# Patient Record
Sex: FEMALE | Race: WHITE | Employment: UNEMPLOYED | URBAN - METROPOLITAN AREA
[De-identification: names, ages, dates, MRNs, and addresses within clinical notes are randomized per-mention and may not be internally consistent; named-entity substitution may affect disease eponyms.]

---

## 2015-05-13 LAB
HPV ABSTRACT: NORMAL
PAP-ABSTRACT: NORMAL

## 2018-06-11 ENCOUNTER — TRANSFERRED RECORDS (OUTPATIENT)
Dept: HEALTH INFORMATION MANAGEMENT | Facility: CLINIC | Age: 53
End: 2018-06-11

## 2018-06-11 LAB — COLOGUARD-ABSTRACT: NEGATIVE

## 2019-06-20 PROBLEM — E03.8 SUBCLINICAL HYPOTHYROIDISM: Status: ACTIVE | Noted: 2019-06-20

## 2019-06-20 NOTE — PROGRESS NOTES
Malignant Neoplasm Of Breast Female Left (HCC)   5/5/2014 Initial Diagnosis   Malignant Neoplasm Of Breast Female Left (HCC)    April 28, 2014, patient underwent screening mammogram, which showed a suspicious lesion. Ultrasound confirmed a suspicious lesion to be 1.7 cm.     May 1, 2014, patient underwent axillary ultrasound, which showed suspicious lymph nodes.       5/5/2014 Biopsy/Pathology   Patient underwent biopsy of the left axillary lymph node and the left breast lesion. The biopsy was consistent with ductal carcinoma in both. The lesion was grade III (of III), ER/ND negative, HER-2 positive with a score of 3+ on immunohistochemistry. Ki-67 was 58.8.       5/8/2014 Other   PET scan showed FDG-avid activity in the left breast and left axilla. There were no other suspicious lesions.        5/30/2014 - Biological/Targeted/Hormone Therapy   Patient initiated her neoadjuvant therapy on the randomized arm with pertuzumab plus T-DM1.       8/29/2014 Biopsy/Pathology   Patient's right-sided lymph node biopsy was negative for any malignancy. This was performed after MRI of the breast showed suspicious lymph nodes in the right axilla. The same MRI showed excellent response of her primary tumor and left-sided adenopathy.        9/3/2014 - 10/15/2014 Chemotherapy   Patient started with cycle number one of AC.    October 15, 2014, patient finished neoadjuvant therapy.        11/12/2014 Surgery and Procedures   Patient underwent a skin-sparing double mastectomy and sentinel lymph node biopsy. She had a complete response on pathological specimen. No residual tumor was found.        - 1/27/2015 Radiation Therapy   Patient finished XRT, 50 Gy.      - 11/17/2015 Biological/Targeted/Hormone Therapy   Patient finished one year of adjuvant trastuzumab.       AXILLARY SENTINEL LYMPH NODE BIOPSY, PREOPERATIVE LYMPHOSCINTIGRAPHY Bilateral 11/12/2014   Axillary sentinel lymph node biopsy, preoperative lymphoscintigraphy     BREAST  RECONSTRUCTION-TISSUE EXPANDER Bilateral 11/12/2014   Breast Reconstruction-tissue expander     BREAST-IMPLANT EXCHANGE-SILICONE Bilateral 08/13/2015   Breast-implant exchange-silicone     BREAST-REVISION Bilateral 07/28/2016   Breast-revision     BREAST-REVISION Bilateral 11/30/2015   Breast-revision     HEMILAMINECTOMY N/A 09/26/2001   Hemilaminectomy     HYSTEROSCOPY N/A 05/27/2014   >Office hysteroscopy without endometrial biopsy.     HYSTEROSCOPY W/ POLYPECTOMY N/A 08/12/2014   >Operative hysteroscopy with polypectomy.     INJECTION-FAT Bilateral 07/28/2016   Injection-fatNotes: to breasts; donor site abdomen     INJECTION-FAT Bilateral 08/13/2015   Injection-fatNotes: to breasts     INJECTION-FAT Left 11/30/2015   Injection-fatNotes: to breast, Donor site Abdomen     LIGATION OF FALLOPIAN TUBE N/A 08/11/1987   Tubal ligation     LUMBAR DISCECTOMY N/A 09/26/2001   >Left fifth lumbar total hemilaminectomy, diskectomy.     MASTECTOMY - SKIN SPARING Bilateral 11/12/2014   Mastectomy - skin sparing     TONSILLECTOMY N/A

## 2019-06-21 ENCOUNTER — OFFICE VISIT (OUTPATIENT)
Dept: FAMILY MEDICINE | Facility: CLINIC | Age: 54
End: 2019-06-21
Payer: COMMERCIAL

## 2019-06-21 VITALS
TEMPERATURE: 97.6 F | SYSTOLIC BLOOD PRESSURE: 134 MMHG | OXYGEN SATURATION: 98 % | BODY MASS INDEX: 33.99 KG/M2 | HEART RATE: 98 BPM | WEIGHT: 204 LBS | DIASTOLIC BLOOD PRESSURE: 86 MMHG | HEIGHT: 65 IN

## 2019-06-21 DIAGNOSIS — R73.03 PREDIABETES: ICD-10-CM

## 2019-06-21 DIAGNOSIS — Z00.00 ROUTINE GENERAL MEDICAL EXAMINATION AT A HEALTH CARE FACILITY: Primary | ICD-10-CM

## 2019-06-21 DIAGNOSIS — I10 ESSENTIAL HYPERTENSION: ICD-10-CM

## 2019-06-21 DIAGNOSIS — G62.9 NEUROPATHY: ICD-10-CM

## 2019-06-21 DIAGNOSIS — Z92.21 S/P CHEMOTHERAPY, TIME SINCE GREATER THAN 12 WEEKS: ICD-10-CM

## 2019-06-21 DIAGNOSIS — E78.5 DYSLIPIDEMIA WITH ELEVATED LOW DENSITY LIPOPROTEIN (LDL) CHOLESTEROL AND ABNORMALLY LOW HIGH DENSITY LIPOPROTEIN CHOLESTEROL: ICD-10-CM

## 2019-06-21 DIAGNOSIS — C77.3 BREAST CANCER METASTASIZED TO AXILLARY LYMPH NODE, LEFT (H): ICD-10-CM

## 2019-06-21 DIAGNOSIS — Z80.41 FAMILY HISTORY OF OVARIAN CANCER: ICD-10-CM

## 2019-06-21 DIAGNOSIS — Z90.13 S/P MASTECTOMY, BILATERAL: ICD-10-CM

## 2019-06-21 DIAGNOSIS — Z98.890 S/P DISCECTOMY: ICD-10-CM

## 2019-06-21 DIAGNOSIS — R10.13 DYSPEPSIA: ICD-10-CM

## 2019-06-21 DIAGNOSIS — C50.912 BREAST CANCER METASTASIZED TO AXILLARY LYMPH NODE, LEFT (H): ICD-10-CM

## 2019-06-21 DIAGNOSIS — E03.8 SUBCLINICAL HYPOTHYROIDISM: ICD-10-CM

## 2019-06-21 DIAGNOSIS — E55.9 VITAMIN D DEFICIENCY: ICD-10-CM

## 2019-06-21 DIAGNOSIS — K21.00 GASTROESOPHAGEAL REFLUX DISEASE WITH ESOPHAGITIS: ICD-10-CM

## 2019-06-21 DIAGNOSIS — Z98.82 S/P SILICONE BREAST IMPLANT: ICD-10-CM

## 2019-06-21 LAB
ALBUMIN SERPL-MCNC: 3.8 G/DL (ref 3.4–5)
ALP SERPL-CCNC: 88 U/L (ref 40–150)
ALT SERPL W P-5'-P-CCNC: 58 U/L (ref 0–50)
ANION GAP SERPL CALCULATED.3IONS-SCNC: 11 MMOL/L (ref 3–14)
AST SERPL W P-5'-P-CCNC: 48 U/L (ref 0–45)
BILIRUB SERPL-MCNC: 0.5 MG/DL (ref 0.2–1.3)
BUN SERPL-MCNC: 18 MG/DL (ref 7–30)
CALCIUM SERPL-MCNC: 9.2 MG/DL (ref 8.5–10.1)
CANCER AG27-29 SERPL-ACNC: 14 U/ML (ref 0–39)
CHLORIDE SERPL-SCNC: 104 MMOL/L (ref 94–109)
CHOLEST SERPL-MCNC: 192 MG/DL
CO2 SERPL-SCNC: 28 MMOL/L (ref 20–32)
CREAT SERPL-MCNC: 0.76 MG/DL (ref 0.52–1.04)
ERYTHROCYTE [DISTWIDTH] IN BLOOD BY AUTOMATED COUNT: 15.3 % (ref 10–15)
FERRITIN SERPL-MCNC: 50 NG/ML (ref 8–252)
GFR SERPL CREATININE-BSD FRML MDRD: 90 ML/MIN/{1.73_M2}
GLUCOSE SERPL-MCNC: 102 MG/DL (ref 70–99)
HBA1C MFR BLD: 5.7 % (ref 0–5.6)
HCT VFR BLD AUTO: 45.4 % (ref 35–47)
HDLC SERPL-MCNC: 51 MG/DL
HGB BLD-MCNC: 15.2 G/DL (ref 11.7–15.7)
LDLC SERPL CALC-MCNC: 120 MG/DL
MAGNESIUM SERPL-MCNC: 2.3 MG/DL (ref 1.6–2.3)
MCH RBC QN AUTO: 27.8 PG (ref 26.5–33)
MCHC RBC AUTO-ENTMCNC: 33.5 G/DL (ref 31.5–36.5)
MCV RBC AUTO: 83 FL (ref 78–100)
NONHDLC SERPL-MCNC: 141 MG/DL
PLATELET # BLD AUTO: 300 10E9/L (ref 150–450)
POTASSIUM SERPL-SCNC: 3 MMOL/L (ref 3.4–5.3)
PROT SERPL-MCNC: 7.6 G/DL (ref 6.8–8.8)
RBC # BLD AUTO: 5.47 10E12/L (ref 3.8–5.2)
SODIUM SERPL-SCNC: 143 MMOL/L (ref 133–144)
TRIGL SERPL-MCNC: 107 MG/DL
TSH SERPL DL<=0.005 MIU/L-ACNC: 2.95 MU/L (ref 0.4–4)
VIT B12 SERPL-MCNC: 589 PG/ML (ref 193–986)
WBC # BLD AUTO: 6.7 10E9/L (ref 4–11)

## 2019-06-21 PROCEDURE — 85027 COMPLETE CBC AUTOMATED: CPT | Performed by: INTERNAL MEDICINE

## 2019-06-21 PROCEDURE — 86300 IMMUNOASSAY TUMOR CA 15-3: CPT | Performed by: INTERNAL MEDICINE

## 2019-06-21 PROCEDURE — 83036 HEMOGLOBIN GLYCOSYLATED A1C: CPT | Performed by: INTERNAL MEDICINE

## 2019-06-21 PROCEDURE — 83735 ASSAY OF MAGNESIUM: CPT | Performed by: INTERNAL MEDICINE

## 2019-06-21 PROCEDURE — 80061 LIPID PANEL: CPT | Performed by: INTERNAL MEDICINE

## 2019-06-21 PROCEDURE — 99213 OFFICE O/P EST LOW 20 MIN: CPT | Mod: 25 | Performed by: INTERNAL MEDICINE

## 2019-06-21 PROCEDURE — 82607 VITAMIN B-12: CPT | Performed by: INTERNAL MEDICINE

## 2019-06-21 PROCEDURE — 80053 COMPREHEN METABOLIC PANEL: CPT | Performed by: INTERNAL MEDICINE

## 2019-06-21 PROCEDURE — 99386 PREV VISIT NEW AGE 40-64: CPT | Performed by: INTERNAL MEDICINE

## 2019-06-21 PROCEDURE — 86141 C-REACTIVE PROTEIN HS: CPT | Performed by: INTERNAL MEDICINE

## 2019-06-21 PROCEDURE — 84443 ASSAY THYROID STIM HORMONE: CPT | Performed by: INTERNAL MEDICINE

## 2019-06-21 PROCEDURE — 36415 COLL VENOUS BLD VENIPUNCTURE: CPT | Performed by: INTERNAL MEDICINE

## 2019-06-21 PROCEDURE — 82306 VITAMIN D 25 HYDROXY: CPT | Performed by: INTERNAL MEDICINE

## 2019-06-21 PROCEDURE — 82728 ASSAY OF FERRITIN: CPT | Performed by: INTERNAL MEDICINE

## 2019-06-21 RX ORDER — HYDROCHLOROTHIAZIDE 25 MG/1
25 TABLET ORAL DAILY
COMMUNITY
Start: 2019-01-03 | End: 2019-06-21

## 2019-06-21 RX ORDER — CYCLOBENZAPRINE HCL 10 MG
10 TABLET ORAL
COMMUNITY
Start: 2019-04-30 | End: 2019-07-03

## 2019-06-21 RX ORDER — PREGABALIN 50 MG/1
50 CAPSULE ORAL 2 TIMES DAILY
Qty: 180 CAPSULE | Refills: 3 | Status: SHIPPED | OUTPATIENT
Start: 2019-06-21 | End: 2019-11-04

## 2019-06-21 RX ORDER — PREGABALIN 50 MG/1
50 CAPSULE ORAL 2 TIMES DAILY
Qty: 180 CAPSULE | Refills: 3 | Status: SHIPPED | OUTPATIENT
Start: 2019-06-21 | End: 2019-06-21

## 2019-06-21 RX ORDER — TRIAMCINOLONE ACETONIDE 5 MG/G
1 CREAM TOPICAL PRN
COMMUNITY
Start: 2014-05-09 | End: 2019-07-03

## 2019-06-21 RX ORDER — LORATADINE 10 MG/1
1 TABLET ORAL DAILY
COMMUNITY
Start: 2012-12-11

## 2019-06-21 RX ORDER — CELECOXIB 200 MG/1
200 CAPSULE ORAL DAILY
Qty: 90 CAPSULE | Refills: 3 | Status: SHIPPED | OUTPATIENT
Start: 2019-06-21 | End: 2019-11-04

## 2019-06-21 RX ORDER — AMOXICILLIN 500 MG/1
4 CAPSULE ORAL
COMMUNITY
Start: 2018-06-29 | End: 2020-10-19

## 2019-06-21 RX ORDER — TRIAMTERENE/HYDROCHLOROTHIAZID 37.5-25 MG
1 TABLET ORAL DAILY
Qty: 90 TABLET | Refills: 3 | Status: SHIPPED | OUTPATIENT
Start: 2019-06-21 | End: 2020-06-02

## 2019-06-21 RX ORDER — IBUPROFEN 600 MG/1
1 TABLET, FILM COATED ORAL DAILY PRN
COMMUNITY
Start: 2016-06-15 | End: 2019-06-21

## 2019-06-21 ASSESSMENT — MIFFLIN-ST. JEOR: SCORE: 1531.22

## 2019-06-21 NOTE — PATIENT INSTRUCTIONS
The shingrix is our newer vaccine and is recommended to healthy adults over 55. It's administered in 2 separate doses. You can just walk into the pharmacy and get it when convenient for you. However, it's on a national shortage currently so we recommend calling the pharmacy a head of time to verify they have it in stock ([facility] and many other pharmacies carry this). Generally speaking the pharmacy is covered better by insurance than the clinic.  Please check with your insurance to make sure what is covered/what your copay would be.     Here is a link from the CDC on Shingles Vaccines, where they provide information on this new Shingrix vaccine.   https://www.cdc.gov/shingles/vaccination.html

## 2019-06-21 NOTE — PROGRESS NOTES
SUBJECTIVE:   CC: Bertha Hollingswroth is an 53 year old woman who presents for preventive health visit.   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 Ultrasound confirmed a suspicious lesion to be 1.7 cm.     May 1, 2014, patient underwent axillary ultrasound, which showed suspicious lymph nodes.       5/5/2014 Biopsy/Pathology   Patient underwent biopsy of the left axillary lymph node and the left breast lesion. The biopsy was consistent with ductal carcinoma in both. The lesion was grade III (of III), ER/GA negative, HER-2 positive with a score of 3+ on immunohistochemistry. Ki-67 was 58.8.       5/8/2014 Other   PET scan showed FDG-avid activity in the left breast and left axilla. There were no other suspicious lesions.        5/30/2014 - Biological/Targeted/Hormone Therapy   Patient initiated her neoadjuvant therapy on the randomized arm with pertuzumab plus T-DM1.       8/29/2014 Biopsy/Pathology   Patient's right-sided lymph node biopsy was negative for any malignancy. This was performed after MRI of the breast showed suspicious lymph nodes in the right axilla. The same MRI showed excellent response of her primary tumor and left-sided adenopathy.        9/3/2014 - 10/15/2014 Chemotherapy   Patient started with cycle number one of AC.    October 15, 2014, patient finished neoadjuvant therapy.        11/12/2014 Surgery and Procedures   Patient underwent a skin-sparing double mastectomy and sentinel lymph  node biopsy. She had a complete response on pathological specimen. No residual tumor was found.        - 1/27/2015 Radiation Therapy   Patient finished XRT, 50 Gy.      - 11/17/2015 Biological/Targeted/Hormone Therapy   Patient finished one year of adjuvant trastuzumab.       AXILLARY SENTINEL LYMPH NODE BIOPSY, PREOPERATIVE LYMPHOSCINTIGRAPHY Bilateral 11/12/2014   Axillary sentinel lymph node biopsy, preoperative lymphoscintigraphy     BREAST RECONSTRUCTION-TISSUE EXPANDER Bilateral 11/12/2014   Breast Reconstruction-tissue expander     BREAST-IMPLANT EXCHANGE-SILICONE Bilateral 08/13/2015   Breast-implant exchange-silicone     BREAST-REVISION Bilateral 07/28/2016   Breast-revision     BREAST-REVISION Bilateral 11/30/2015   Breast-revision     HEMILAMINECTOMY N/A 09/26/2001   Hemilaminectomy     HYSTEROSCOPY N/A 05/27/2014   >Office hysteroscopy without endometrial biopsy.     HYSTEROSCOPY W/ POLYPECTOMY N/A 08/12/2014   >Operative hysteroscopy with polypectomy.     INJECTION-FAT Bilateral 07/28/2016   Injection-fatNotes: to breasts; donor site abdomen     INJECTION-FAT Bilateral 08/13/2015   Injection-fatNotes: to breasts     INJECTION-FAT Left 11/30/2015   Injection-fatNotes: to breast, Donor site Abdomen     LIGATION OF FALLOPIAN TUBE N/A 08/11/1987   Tubal ligation     LUMBAR DISCECTOMY N/A 09/26/2001   >Left fifth lumbar total hemilaminectomy, diskectomy.     MASTECTOMY - SKIN SPARING Bilateral 11/12/2014   Mastectomy - skin sparing     TONSILLECTOMY N/A         I have also gathered family history  She has continuous back pain despite back surgery and multiple epidural injections and is on ibuprofen  She has bloating of the stomach  She has not done a colon screen  She has left-sided foot drop  Healthy Habits:     Getting at least 3 servings of Calcium per day:  NO (not every day )    Bi-annual eye exam:  Yes    Dental care twice a year:  Yes    Sleep apnea or symptoms of sleep apnea:  None    Diet:   Regular (no restrictions)    Frequency of exercise:  2-3 days/week    Duration of exercise:  15-30 minutes    Taking medications regularly:  Yes    Barriers to taking medications:  None    Medication side effects:  None    PHQ-2 Total Score: 0    Additional concerns today:  No              Today's PHQ-2 Score:   PHQ-2 ( 1999 Pfizer) 6/21/2019   Q1: Little interest or pleasure in doing things 0   Q2: Feeling down, depressed or hopeless 0   PHQ-2 Score 0       Abuse: Current or Past(Physical, Sexual or Emotional)- No  Do you feel safe in your environment? Yes    Social History     Tobacco Use     Smoking status: Never Smoker     Smokeless tobacco: Never Used   Substance Use Topics     Alcohol use: Yes     Comment: rare maybe 1-2 drinks a YEAR          No flowsheet data found.    Reviewed orders with patient.  Reviewed health maintenance and updated orders accordingly - Yes  BP Readings from Last 3 Encounters:   06/21/19 134/86    Wt Readings from Last 3 Encounters:   06/21/19 92.5 kg (204 lb)                  Patient Active Problem List   Diagnosis     Breast cancer metastasized to axillary lymph node, left (H)     S/P chemotherapy, time since greater than 12 weeks     S/P hemilaminotomy     S/P silicone breast implant     S/P mastectomy, bilateral     Family history of ovarian cancer     Dyslipidemia with elevated low density lipoprotein (LDL) cholesterol and abnormally low high density lipoprotein cholesterol     Subclinical hypothyroidism     Past Surgical History:   Procedure Laterality Date     AS LIGATE FALLOPIAN TUBE       C ANTERIOR LUMBAR DISCECTOMY      2001     HC REVISE BREAST RECONSTRUCTION      2015.2016     HYSTEROSCOPY      2014     TONSILLECTOMY         Social History     Tobacco Use     Smoking status: Never Smoker     Smokeless tobacco: Never Used   Substance Use Topics     Alcohol use: Yes     Comment: rare maybe 1-2 drinks a YEAR      Family History   Problem Relation Age of Onset     Ovarian  "Cancer Mother      Bipolar Disorder Sister      Diabetic Kidney Disease Maternal Grandmother          Current Outpatient Medications   Medication Sig Dispense Refill     amoxicillin (AMOXIL) 500 MG capsule Take 4 capsules by mouth Before dental procedures       celecoxib (CELEBREX) 200 MG capsule Take 1 capsule (200 mg) by mouth daily 90 capsule 3     cyclobenzaprine (FLEXERIL) 10 MG tablet Take 10 mg by mouth nightly as needed       loratadine (CLARITIN) 10 MG tablet Take 1 tablet by mouth daily       omeprazole (PRILOSEC) 20 MG DR capsule Take 20 mg by mouth daily       pregabalin (LYRICA) 50 MG capsule Take 1 capsule (50 mg) by mouth 2 times daily 180 capsule 3     triamcinolone (ARISTOCORT HP) 0.5 % external cream Apply 1 Application topically as needed       triamterene-HCTZ (MAXZIDE-25) 37.5-25 MG tablet Take 1 tablet by mouth daily 90 tablet 3     ranitidine (ZANTAC) 150 MG capsule Take 150 mg by mouth daily as needed             Pertinent mammograms are reviewed under the imaging tab.  History of abnormal Pap smear: NO - age 30-65 PAP every 5 years with negative HPV co-testing recommended     Reviewed and updated as needed this visit by clinical staff  Tobacco  Allergies  Meds  Problems  Fam Hx  Soc Hx        Reviewed and updated as needed this visit by Provider  Tobacco  Meds  Problems  Fam Hx            Review of Systems   ROS: 10 point ROS neg other than the symptoms noted above in the HPI.       OBJECTIVE:   /86   Pulse 98   Temp 97.6  F (36.4  C) (Tympanic)   Ht 1.651 m (5' 5\")   Wt 92.5 kg (204 lb)   SpO2 98%   BMI 33.95 kg/m    Physical Exam  GENERAL: healthy, alert and no distress  EYES: Eyes grossly normal to inspection, PERRL and conjunctivae and sclerae normal  HENT: ear canals and TM's normal, nose and mouth without ulcers or lesions  NECK: no adenopathy, no asymmetry, masses, or scars and thyroid normal to palpation  RESP: lungs clear to auscultation - no rales, rhonchi or " wheezes  BREAST: Bilateral chest wall and surgical scar normal implants are noticed without masses, tenderness or nipple discharge and no palpable axillary masses or adenopathy  Radiation scar is noticed on both sides with implants  CV: regular rate and rhythm, normal S1 S2, no S3 or S4, no murmur, click or rub, no peripheral edema and peripheral pulses strong  ABDOMEN: soft, nontender, no hepatosplenomegaly, no masses and bowel sounds normal  MS: no gross musculoskeletal defects noted, no edema  SKIN: no suspicious lesions or rashes multiple lentigines  NEURO: Normal strength and tone, mentation intact and speech normal  Old left-sided foot drop  PSYCH: mentation appears normal, affect normal/bright        ASSESSMENT/PLAN:   Bertha was seen today for physical.    Diagnoses and all orders for this visit:    Routine general medical examination at a health care facility  She will take the new shingles vaccine and lose weight  Breast cancer metastasized to axillary lymph node, left (H)  -     Vitamin D Deficiency  -     Ca27.29  breast tumor marker    S/P chemotherapy, time since greater than 12 weeks  -     Comprehensive metabolic panel  -     celecoxib (CELEBREX) 200 MG capsule; Take 1 capsule (200 mg) by mouth daily  -     H Pylori antigen, stool; Future    S/P discectomy  We will try Lyrica  We discussed medical marijuana  Orders:  -     Discontinue: pregabalin (LYRICA) 50 MG capsule; Take 1 capsule (50 mg) by mouth 2 times daily  -     [facility] PT, HAND, AND CHIROPRACTIC REFERRAL; Future  -     pregabalin (LYRICA) 50 MG capsule; Take 1 capsule (50 mg) by mouth 2 times daily    S/P silicone breast implant    S/P mastectomy, bilateral    Family history of ovarian cancer    Dyslipidemia with elevated low density lipoprotein (LDL) cholesterol and abnormally low high density lipoprotein cholesterol  -     Comprehensive metabolic panel  -     Lipid panel reflex to direct LDL Fasting  -     CRP cardiac risk  -      "GASTROENTEROLOGY ADULT REF PROCEDURE ONLY [facility] Mendez [phone]; Dr. KACIE Espinoza    Subclinical hypothyroidism  We will check a TSH  Gastroesophageal reflux disease with esophagitis  -     CBC with platelets  -     Magnesium  -     Ferritin    Essential hypertension  -     triamterene-HCTZ (MAXZIDE-25) 37.5-25 MG tablet; Take 1 tablet by mouth daily  We will stop hydrochlorothiazide neuropathy  -     Vitamin B12  -     Hemoglobin A1c  -     TSH with free T4 reflex  -     H Pylori antigen, stool; Future    Prediabetes  -     Hemoglobin A1c    Dyspepsia    We will need endoscopy and colonoscopy    COUNSELING:  This is a very complex  Patient needs to lose 10 to 15 pounds of weight before next visit  We need upper and lower colonoscopy  We need to work-up neuropathy  I discussed with breast reconstructive surgery  We might need to start metformin  We need to increase blood pressure medications  We need to check for genetic counseling    Estimated body mass index is 33.95 kg/m  as calculated from the following:    Height as of this encounter: 1.651 m (5' 5\").    Weight as of this encounter: 92.5 kg (204 lb).    Weight management plan: Discussed healthy diet and exercise guidelines     reports that she has never smoked. She has never used smokeless tobacco.      Counseling Resources:  ATP IV Guidelines  Pooled Cohorts Equation Calculator  Breast Cancer Risk Calculator  FRAX Risk Assessment  ICSI Preventive Guidelines  Dietary Guidelines for Americans, 2010  USDA's MyPlate  ASA Prophylaxis  Lung CA Screening    Zaira Schultz MD  [facility]  "

## 2019-06-24 LAB — CRP SERPL HS-MCNC: 4.2 MG/L

## 2019-06-25 LAB — DEPRECATED CALCIDIOL+CALCIFEROL SERPL-MC: 9 UG/L (ref 20–75)

## 2019-06-26 ENCOUNTER — HOSPITAL ENCOUNTER (OUTPATIENT)
Facility: CLINIC | Age: 54
End: 2019-06-26
Attending: SPECIALIST | Admitting: SPECIALIST
Payer: COMMERCIAL

## 2019-07-03 ENCOUNTER — MYC MEDICAL ADVICE (OUTPATIENT)
Dept: FAMILY MEDICINE | Facility: CLINIC | Age: 54
End: 2019-07-03

## 2019-07-03 DIAGNOSIS — R21 RASH: ICD-10-CM

## 2019-07-03 DIAGNOSIS — Z98.890 S/P DISCECTOMY: Primary | ICD-10-CM

## 2019-07-03 RX ORDER — CYCLOBENZAPRINE HCL 10 MG
10 TABLET ORAL
Qty: 30 TABLET | Refills: 1 | Status: SHIPPED | OUTPATIENT
Start: 2019-07-03 | End: 2019-09-10

## 2019-07-03 RX ORDER — TRIAMCINOLONE ACETONIDE 5 MG/G
CREAM TOPICAL PRN
Qty: 30 G | Refills: 1 | Status: SHIPPED | OUTPATIENT
Start: 2019-07-03 | End: 2020-10-01

## 2019-07-03 NOTE — TELEPHONE ENCOUNTER
Last Written Prescription Date:  N/A MEDS HISTORICAL    Flexeril 10mg historical on med list - 10mg at HS PRN, not previously sent through Epic     Triamcinolone 0.5% cream - historical as apply 1 application topical as needed    Last office visit: 6/21/2019 with prescribing provider:  Dr. Schultz      Routing refill request to provider for review/approval because:  Drugs not active on patient's medication list    Abimbola RICHEY RN

## 2019-07-22 ENCOUNTER — HOSPITAL ENCOUNTER (OUTPATIENT)
Facility: CLINIC | Age: 54
End: 2019-07-22
Attending: SPECIALIST | Admitting: SPECIALIST
Payer: COMMERCIAL

## 2019-09-10 ENCOUNTER — MYC REFILL (OUTPATIENT)
Dept: FAMILY MEDICINE | Facility: CLINIC | Age: 54
End: 2019-09-10

## 2019-09-10 DIAGNOSIS — Z98.890 S/P DISCECTOMY: ICD-10-CM

## 2019-09-10 DIAGNOSIS — E55.9 VITAMIN D DEFICIENCY: ICD-10-CM

## 2019-09-11 RX ORDER — CYCLOBENZAPRINE HCL 10 MG
10 TABLET ORAL
Start: 2019-09-11

## 2019-09-11 RX ORDER — CYCLOBENZAPRINE HCL 10 MG
10 TABLET ORAL
Qty: 30 TABLET | Refills: 1 | Status: SHIPPED | OUTPATIENT
Start: 2019-09-11 | End: 2019-11-04

## 2019-09-11 NOTE — TELEPHONE ENCOUNTER
"Routing refill request to provider for review/approval because:  Drug not on the [facility] refill protocol   Shamika CONDE RN    Last Written Prescription Date:    Last Fill Quantity: ,  # refills:    Last office visit: 6/21/2019 with prescribing provider:     Future Office Visit:   Next 5 appointments (look out 90 days)    Nov 04, 2019  1:30 PM CST  [facility] [address] [id number] [phone]         Requested Prescriptions   Pending Prescriptions Disp Refills     vitamin D3 (CHOLECALCIFEROL) 08706 units capsule 12 capsule 0     Sig: Take 1 capsule (50,000 Units) by mouth once a week       Vitamin Supplements (Adult) Protocol Failed - 9/10/2019  4:55 PM        Failed - High dose Vitamin D not ordered        Passed - Recent (12 mo) or future (30 days) visit within the authorizing provider's specialty     Patient had office visit in the last 12 months or has a visit in the next 30 days with authorizing provider or within the authorizing provider's specialty.  See \"Patient Info\" tab in inbasket, or \"Choose Columns\" in Meds & Orders section of the refill encounter.              Passed - Medication is active on med list        cyclobenzaprine (FLEXERIL) 10 MG tablet 30 tablet 1     Sig: Take 1 tablet (10 mg) by mouth nightly as needed       There is no refill protocol information for this order        "

## 2019-09-11 NOTE — TELEPHONE ENCOUNTER
Routing refill request to provider for review/approval because:  Vitamin D3- Pt is overdue for Vitamin D recheck-  requests to remain on Vitamin D3 until her visit with PCP on 11/4    Cyclobenzaprine- Drug not on the [facility] refill protocol     cyclobenzaprine (FLEXERIL) 10 MG tablet  Last Written Prescription Date: 7/3/19  Last Fill Quantity: 30,  # refills: 1   Last office visit: 6/21/2019 with prescribing provider:  Marion Traylor Office Visit:   Next 5 appointments (look out 90 days)    Nov 04, 2019  1:30 PM CST  [facility] [address] [id number] [phone]            vitamin D3 (CHOLECALCIFEROL) 53866 units capsule  Last Written Prescription Date:  6/25/19  Last Fill Quantity: 12,  # refills: 0   Last office visit: 6/21/2019 with prescribing provider:  Marion Traylor Office Visit:

## 2019-11-04 ENCOUNTER — HOSPITAL ENCOUNTER (OUTPATIENT)
Dept: CT IMAGING | Facility: CLINIC | Age: 54
Discharge: HOME | End: 2019-11-04
Attending: INTERNAL MEDICINE | Admitting: INTERNAL MEDICINE
Payer: COMMERCIAL

## 2019-11-04 ENCOUNTER — OFFICE VISIT (OUTPATIENT)
Dept: FAMILY MEDICINE | Facility: CLINIC | Age: 54
End: 2019-11-04
Payer: COMMERCIAL

## 2019-11-04 VITALS
SYSTOLIC BLOOD PRESSURE: 139 MMHG | BODY MASS INDEX: 30.82 KG/M2 | TEMPERATURE: 98.1 F | WEIGHT: 185 LBS | OXYGEN SATURATION: 94 % | HEIGHT: 65 IN | DIASTOLIC BLOOD PRESSURE: 88 MMHG | HEART RATE: 92 BPM

## 2019-11-04 DIAGNOSIS — E03.8 SUBCLINICAL HYPOTHYROIDISM: ICD-10-CM

## 2019-11-04 DIAGNOSIS — Z98.890 S/P DISCECTOMY: ICD-10-CM

## 2019-11-04 DIAGNOSIS — E87.6 HYPOKALEMIA: ICD-10-CM

## 2019-11-04 DIAGNOSIS — Z12.11 SPECIAL SCREENING FOR MALIGNANT NEOPLASMS, COLON: ICD-10-CM

## 2019-11-04 DIAGNOSIS — E78.5 DYSLIPIDEMIA WITH ELEVATED LOW DENSITY LIPOPROTEIN (LDL) CHOLESTEROL AND ABNORMALLY LOW HIGH DENSITY LIPOPROTEIN CHOLESTEROL: ICD-10-CM

## 2019-11-04 DIAGNOSIS — E55.9 VITAMIN D DEFICIENCY: ICD-10-CM

## 2019-11-04 DIAGNOSIS — K75.81 NASH (NONALCOHOLIC STEATOHEPATITIS): ICD-10-CM

## 2019-11-04 DIAGNOSIS — R10.84 ABDOMINAL PAIN, GENERALIZED: Primary | ICD-10-CM

## 2019-11-04 DIAGNOSIS — Z92.21 S/P CHEMOTHERAPY, TIME SINCE GREATER THAN 12 WEEKS: ICD-10-CM

## 2019-11-04 DIAGNOSIS — I10 ESSENTIAL HYPERTENSION: ICD-10-CM

## 2019-11-04 DIAGNOSIS — R10.84 ABDOMINAL PAIN, GENERALIZED: ICD-10-CM

## 2019-11-04 LAB
ERYTHROCYTE [DISTWIDTH] IN BLOOD BY AUTOMATED COUNT: 16.1 % (ref 10–15)
HBA1C MFR BLD: 5.4 % (ref 0–5.6)
HCT VFR BLD AUTO: 46.7 % (ref 35–47)
HGB BLD-MCNC: 15.8 G/DL (ref 11.7–15.7)
MCH RBC QN AUTO: 28.2 PG (ref 26.5–33)
MCHC RBC AUTO-ENTMCNC: 33.8 G/DL (ref 31.5–36.5)
MCV RBC AUTO: 83 FL (ref 78–100)
PLATELET # BLD AUTO: 277 10E9/L (ref 150–450)
RBC # BLD AUTO: 5.61 10E12/L (ref 3.8–5.2)
WBC # BLD AUTO: 8.3 10E9/L (ref 4–11)

## 2019-11-04 PROCEDURE — 80061 LIPID PANEL: CPT | Performed by: INTERNAL MEDICINE

## 2019-11-04 PROCEDURE — 80053 COMPREHEN METABOLIC PANEL: CPT | Performed by: INTERNAL MEDICINE

## 2019-11-04 PROCEDURE — 99214 OFFICE O/P EST MOD 30 MIN: CPT | Performed by: INTERNAL MEDICINE

## 2019-11-04 PROCEDURE — 25000128 H RX IP 250 OP 636: Performed by: INTERNAL MEDICINE

## 2019-11-04 PROCEDURE — 84443 ASSAY THYROID STIM HORMONE: CPT | Performed by: INTERNAL MEDICINE

## 2019-11-04 PROCEDURE — 74177 CT ABD & PELVIS W/CONTRAST: CPT

## 2019-11-04 PROCEDURE — 25000125 ZZHC RX 250: Performed by: INTERNAL MEDICINE

## 2019-11-04 PROCEDURE — 36415 COLL VENOUS BLD VENIPUNCTURE: CPT | Performed by: INTERNAL MEDICINE

## 2019-11-04 PROCEDURE — 82306 VITAMIN D 25 HYDROXY: CPT | Performed by: INTERNAL MEDICINE

## 2019-11-04 PROCEDURE — 85027 COMPLETE CBC AUTOMATED: CPT | Performed by: INTERNAL MEDICINE

## 2019-11-04 PROCEDURE — 83036 HEMOGLOBIN GLYCOSYLATED A1C: CPT | Performed by: INTERNAL MEDICINE

## 2019-11-04 RX ORDER — CYCLOBENZAPRINE HCL 10 MG
10 TABLET ORAL 3 TIMES DAILY PRN
Qty: 270 TABLET | Refills: 1 | Status: SHIPPED | OUTPATIENT
Start: 2019-11-04 | End: 2020-04-14

## 2019-11-04 RX ORDER — PREGABALIN 75 MG/1
75 CAPSULE ORAL 2 TIMES DAILY
Qty: 90 CAPSULE | Refills: 1 | Status: SHIPPED | OUTPATIENT
Start: 2019-11-04 | End: 2020-10-19

## 2019-11-04 RX ORDER — IOPAMIDOL 755 MG/ML
91 INJECTION, SOLUTION INTRAVASCULAR ONCE
Status: COMPLETED | OUTPATIENT
Start: 2019-11-04 | End: 2019-11-04

## 2019-11-04 RX ADMIN — SODIUM CHLORIDE 66 ML: 9 INJECTION, SOLUTION INTRAVENOUS at 15:02

## 2019-11-04 RX ADMIN — IOPAMIDOL 91 ML: 755 INJECTION, SOLUTION INTRAVENOUS at 15:02

## 2019-11-04 ASSESSMENT — MIFFLIN-ST. JEOR: SCORE: 1440.03

## 2019-11-04 NOTE — PROGRESS NOTES
Subjective     Bertha Hollingsworth is a 54 year old female who presents to clinic today for the following health issues:    HPI           Chief Complaint   Patient presents with     Hypertension     fu     Follow Up     labs   Latrell had death of her stepfather and could not follow on many of the instructions given to her  Pregabalin given to her for the leg pain was extremely expensive and leg has been extremely painful  She is been off ibuprofen because that affects her abdomen  Her abdomen does not feel normal and she constantly worries about pancreatic cancer because of multiple cancers in her family  She has lost about 20 pounds of weight with diligent eating and exercising        Patient Active Problem List   Diagnosis     Breast cancer metastasized to axillary lymph node, left (H)     S/P chemotherapy, time since greater than 12 weeks     S/P hemilaminotomy     S/P silicone breast implant     S/P mastectomy, bilateral     Family history of ovarian cancer     Dyslipidemia with elevated low density lipoprotein (LDL) cholesterol and abnormally low high density lipoprotein cholesterol     Subclinical hypothyroidism     Past Surgical History:   Procedure Laterality Date     AS LIGATE FALLOPIAN TUBE       C ANTERIOR LUMBAR DISCECTOMY      2001     HC REVISE BREAST RECONSTRUCTION      2015.2016     HYSTEROSCOPY      2014     TONSILLECTOMY         Social History     Tobacco Use     Smoking status: Never Smoker     Smokeless tobacco: Never Used   Substance Use Topics     Alcohol use: Yes     Comment: rare maybe 1-2 drinks a YEAR      Family History   Problem Relation Age of Onset     Ovarian Cancer Mother      Bipolar Disorder Sister      Diabetic Kidney Disease Maternal Grandmother          Current Outpatient Medications   Medication Sig Dispense Refill     amoxicillin (AMOXIL) 500 MG capsule Take 4 capsules by mouth Before dental procedures       cyclobenzaprine (FLEXERIL) 10 MG tablet Take 1 tablet (10 mg) by mouth 3  "times daily as needed for muscle spasms 270 tablet 1     loratadine (CLARITIN) 10 MG tablet Take 1 tablet by mouth daily       omeprazole (PRILOSEC) 20 MG DR capsule Take 1 capsule (20 mg) by mouth 2 times daily 180 capsule 0     pregabalin (LYRICA) 75 MG capsule Take 1 capsule (75 mg) by mouth 2 times daily 90 capsule 1     triamcinolone (ARISTOCORT HP) 0.5 % external cream Apply topically as needed 30 g 1     triamterene-HCTZ (MAXZIDE-25) 37.5-25 MG tablet Take 1 tablet by mouth daily 90 tablet 3     vitamin D3 (CHOLECALCIFEROL) 16607 units capsule Take 1 capsule (50,000 Units) by mouth once a week 12 capsule 0         Reviewed and updated as needed this visit by Provider  Tobacco  Allergies  Meds  Problems  Med Hx  Surg Hx  Fam Hx         Review of Systems   10 point ROS of systems including Constitutional, Eyes, Respiratory, Cardiovascular, Gastroenterology, Genitourinary, Integumentary, Muscularskeletal, Psychiatric were all negative except for pertinent positives noted in my HPI.        Objective    /88   Pulse 92   Temp 98.1  F (36.7  C) (Oral)   Ht 1.651 m (5' 5\")   Wt 83.9 kg (185 lb)   SpO2 94%   BMI 30.79 kg/m    Body mass index is 30.79 kg/m .  Physical Exam   Abdomen is soft and tender in the left upper quadrant and periumbilical area            Assessment & Plan     Bertha was seen today for hypertension and follow up.    Diagnoses and all orders for this visit:    Abdominal pain, generalized  -     CBC with platelets  -     omeprazole (PRILOSEC) 20 MG DR capsule; Take 1 capsule (20 mg) by mouth 2 times daily  -     CT Abdomen Pelvis w Contrast; Future    S/P discectomy  Comments:  left sided mammogram  Orders:  -     cyclobenzaprine (FLEXERIL) 10 MG tablet; Take 1 tablet (10 mg) by mouth 3 times daily as needed for muscle spasms  -     CARE COORDINATION REFERRAL  -     pregabalin (LYRICA) 75 MG capsule; Take 1 capsule (75 mg) by mouth 2 times daily    Dyslipidemia with elevated low " "density lipoprotein (LDL) cholesterol and abnormally low high density lipoprotein cholesterol  -     Comprehensive metabolic panel  -     Lipid panel reflex to direct LDL Fasting  -     Hemoglobin A1c    Essential hypertension  -     **UA reflex to Microscopic FUTURE anytime; Future    Subclinical hypothyroidism  -     TSH with free T4 reflex    Vitamin D deficiency  -     Vitamin D Deficiency  -     **Vitamin D Deficiency FUTURE anytime; Future    Hypokalemia  -     **Basic metabolic panel FUTURE anytime; Future    AGUILAR (nonalcoholic steatohepatitis)    S/P discectomy  -     cyclobenzaprine (FLEXERIL) 10 MG tablet; Take 1 tablet (10 mg) by mouth 3 times daily as needed for muscle spasms  -     CARE COORDINATION REFERRAL  -     pregabalin (LYRICA) 75 MG capsule; Take 1 capsule (75 mg) by mouth 2 times daily    Special screening for malignant neoplasms, colon  -     Fecal colorectal cancer screen (FIT); Future    S/P chemotherapy, time since greater than 12 weeks  -     CT Abdomen Pelvis w Contrast; Future      Latrell has lost about 20 pounds   She has not an EGD and colonoscopy   But agrees to do a stool testing for colon cancer screening   We should do a CT abdomen to rule out pancreatic mass   She will stop nonsteroidals altogether   We will try Lyrica and I provided her with coupons and we will also get care coordination involved because of the cost of the care     I would not change any further medications until the labs are back   Patient agrees   Flu shot today BMI:   Estimated body mass index is 30.79 kg/m  as calculated from the following:    Height as of this encounter: 1.651 m (5' 5\").    Weight as of this encounter: 83.9 kg (185 lb).               Return in about 8 months (around 7/4/2020) for Physical Exam.    Zaira Schultz MD  [facility]      "

## 2019-11-05 ENCOUNTER — PATIENT OUTREACH (OUTPATIENT)
Dept: CARE COORDINATION | Facility: CLINIC | Age: 54
End: 2019-11-05

## 2019-11-05 DIAGNOSIS — Z79.899 MEDICATION MANAGEMENT: Primary | ICD-10-CM

## 2019-11-05 LAB
ALBUMIN SERPL-MCNC: 4.3 G/DL (ref 3.4–5)
ALP SERPL-CCNC: 79 U/L (ref 40–150)
ALT SERPL W P-5'-P-CCNC: 51 U/L (ref 0–50)
ANION GAP SERPL CALCULATED.3IONS-SCNC: 8 MMOL/L (ref 3–14)
AST SERPL W P-5'-P-CCNC: 42 U/L (ref 0–45)
BILIRUB SERPL-MCNC: 0.6 MG/DL (ref 0.2–1.3)
BUN SERPL-MCNC: 16 MG/DL (ref 7–30)
CALCIUM SERPL-MCNC: 9.4 MG/DL (ref 8.5–10.1)
CHLORIDE SERPL-SCNC: 104 MMOL/L (ref 94–109)
CHOLEST SERPL-MCNC: 231 MG/DL
CO2 SERPL-SCNC: 27 MMOL/L (ref 20–32)
CREAT SERPL-MCNC: 0.87 MG/DL (ref 0.52–1.04)
DEPRECATED CALCIDIOL+CALCIFEROL SERPL-MC: 77 UG/L (ref 20–75)
GFR SERPL CREATININE-BSD FRML MDRD: 75 ML/MIN/{1.73_M2}
GLUCOSE SERPL-MCNC: 97 MG/DL (ref 70–99)
HDLC SERPL-MCNC: 62 MG/DL
LDLC SERPL CALC-MCNC: 143 MG/DL
NONHDLC SERPL-MCNC: 169 MG/DL
POTASSIUM SERPL-SCNC: 3.3 MMOL/L (ref 3.4–5.3)
PROT SERPL-MCNC: 7.7 G/DL (ref 6.8–8.8)
SODIUM SERPL-SCNC: 139 MMOL/L (ref 133–144)
TRIGL SERPL-MCNC: 129 MG/DL
TSH SERPL DL<=0.005 MIU/L-ACNC: 2.62 MU/L (ref 0.4–4)

## 2019-11-05 RX ORDER — LISINOPRIL 10 MG/1
10 TABLET ORAL AT BEDTIME
Qty: 90 TABLET | Refills: 3 | Status: SHIPPED | OUTPATIENT
Start: 2019-11-05 | End: 2020-10-19

## 2019-11-05 NOTE — PROGRESS NOTES
Clinic Care Coordination Contact  PCP referral :   Reason for Referral: Financial Support: Medication Affordability     Additional pertinent details:  can't afford Lyrica    CC spoke to patient and she states Dr Schultz gave her a Good RX coupon and hopes this will help with her cost     CC informed patient of MTM referral and that [place] Prescription Assistance Program is available     Patient will call CC if she needs further resources for her medication cost  Patient not opened to CC at this time  Patient has CC contact information for future questions or concerns     [facility]     Alana Rai RN Care Coordinator   [facility]   Phone: [phone]  Email :  [email or web address]

## 2020-02-18 DIAGNOSIS — R10.84 ABDOMINAL PAIN, GENERALIZED: ICD-10-CM

## 2020-02-18 NOTE — TELEPHONE ENCOUNTER
"Last Written Prescription Date:  11/4/19  Last Fill Quantity: 180,  # refills: 0   Last office visit: 11/4/2019 with prescribing provider:     Future Office Visit:    Requested Prescriptions   Pending Prescriptions Disp Refills     OMEPRAZOLE 20 MG PO DR capsule [Pharmacy Med Name: OMEPRAZOLE DR CAPS 20MG] 180 capsule 4     Sig: TAKE 1 CAPSULE TWICE A DAY       PPI Protocol Passed - 2/18/2020 12:43 PM        Passed - Not on Clopidogrel (unless Pantoprazole ordered)        Passed - No diagnosis of osteoporosis on record        Passed - Recent (12 mo) or future (30 days) visit within the authorizing provider's specialty     Patient has had an office visit with the authorizing provider or a provider within the authorizing providers department within the previous 12 mos or has a future within next 30 days. See \"Patient Info\" tab in inbasket, or \"Choose Columns\" in Meds & Orders section of the refill encounter.              Passed - Medication is active on med list        Passed - Patient is age 18 or older        Passed - No active pregnacy on record        Passed - No positive pregnancy test in past 12 months          "

## 2020-02-20 NOTE — TELEPHONE ENCOUNTER
Routing refill request to provider for review/approval because:  Drug not on the [facility] refill protocol  For abd pain diagnosis.  Please authorize if appropriate.  Thanks,  Morenita Galindo RN

## 2020-03-11 ENCOUNTER — HEALTH MAINTENANCE LETTER (OUTPATIENT)
Age: 55
End: 2020-03-11

## 2020-04-13 DIAGNOSIS — Z98.890 S/P DISCECTOMY: ICD-10-CM

## 2020-04-13 DIAGNOSIS — I10 ESSENTIAL HYPERTENSION: ICD-10-CM

## 2020-04-13 NOTE — TELEPHONE ENCOUNTER
"cyclobenzaprine (FLEXERIL) 10 MG tablet      Last Written Prescription Date:  11/04/19  Last Fill Quantity: 270 tablet,   # refills: 1  Last Office Visit: 11/4/2019 ([place])  Future Office visit:      Routing refill request to provider for review/approval because:  Drug not on the [facility], P or [facility] refill protocol or controlled substance      triamterene-HCTZ (MAXZIDE-25) 37.5-25 MG tablet  Last Written Prescription Date:  6/21/19  Last Fill Quantity: 90 tablet,  # refills: 3   Last office visit: 11/4/2019 with prescribing provider:  [place]   Future Office Visit:   Next 5 appointments (look out 90 days)    Jul 03, 2020  8:30 AM CDT  PHYSICAL with Zaira Schultz MD  [facility] [address] [address]  [phone]             Requested Prescriptions   Pending Prescriptions Disp Refills     cyclobenzaprine (FLEXERIL) 10 MG tablet [Pharmacy Med Name: CYCLOBENZAPRINE HCL TABS 10MG] 270 tablet 3     Sig: TAKE 1 TABLET THREE TIMES A DAY AS NEEDED FOR MUSCLE SPASMS       There is no refill protocol information for this order        triamterene-HCTZ (MAXZIDE-25) 37.5-25 MG tablet [Pharmacy Med Name: TRIAMTERENE/HCTZ TABS 37.5/25MG] 90 tablet 3     Sig: TAKE 1 TABLET DAILY       Diuretics (Including Combos) Protocol Failed - 4/13/2020  5:57 PM        Failed - Normal serum potassium on file in past 12 months     Recent Labs   Lab Test 11/04/19  1420   POTASSIUM 3.3*                    Passed - Blood pressure under 140/90 in past 12 months     BP Readings from Last 3 Encounters:   11/04/19 139/88   06/21/19 134/86                 Passed - Recent (12 mo) or future (30 days) visit within the authorizing provider's specialty     Patient has had an office visit with the authorizing provider or a provider within the authorizing providers department within the previous 12 mos or has a future within next 30 days. See \"Patient Info\" tab in inbasket, or \"Choose Columns\" in Meds & " Orders section of the refill encounter.              Passed - Medication is active on med list        Passed - Patient is age 18 or older        Passed - No active pregancy on record        Passed - Normal serum creatinine on file in past 12 months     Recent Labs   Lab Test 11/04/19  1420   CR 0.87              Passed - Normal serum sodium on file in past 12 months     Recent Labs   Lab Test 11/04/19  1420                 Passed - No positive pregnancy test in past 12 months

## 2020-04-14 RX ORDER — TRIAMTERENE/HYDROCHLOROTHIAZID 37.5-25 MG
TABLET ORAL
Qty: 90 TABLET | Refills: 3 | OUTPATIENT
Start: 2020-04-14

## 2020-04-14 RX ORDER — CYCLOBENZAPRINE HCL 10 MG
TABLET ORAL
Qty: 270 TABLET | Refills: 3 | Status: SHIPPED | OUTPATIENT
Start: 2020-04-14 | End: 2020-10-19

## 2020-04-14 NOTE — TELEPHONE ENCOUNTER
Routing refill request to provider for review/approval because:  Drug not on the [facility] refill protocol   Patient has refills remaining at pharmacy-triamterene-hydrochlorothiazide    JEMIMA Craft, RN  [employer] [facility]

## 2020-08-05 ENCOUNTER — MYC REFILL (OUTPATIENT)
Dept: FAMILY MEDICINE | Facility: CLINIC | Age: 55
End: 2020-08-05

## 2020-08-05 ENCOUNTER — E-VISIT (OUTPATIENT)
Dept: FAMILY MEDICINE | Facility: CLINIC | Age: 55
End: 2020-08-05
Payer: COMMERCIAL

## 2020-08-05 DIAGNOSIS — N39.0 ACUTE UTI (URINARY TRACT INFECTION): ICD-10-CM

## 2020-08-05 DIAGNOSIS — N30.00 ACUTE CYSTITIS WITHOUT HEMATURIA: ICD-10-CM

## 2020-08-05 DIAGNOSIS — I10 ESSENTIAL HYPERTENSION: ICD-10-CM

## 2020-08-05 PROCEDURE — 99421 OL DIG E/M SVC 5-10 MIN: CPT | Performed by: INTERNAL MEDICINE

## 2020-08-05 RX ORDER — NITROFURANTOIN 25; 75 MG/1; MG/1
100 CAPSULE ORAL 2 TIMES DAILY
Qty: 14 CAPSULE | Refills: 0 | Status: SHIPPED | OUTPATIENT
Start: 2020-08-05 | End: 2020-10-19

## 2020-08-05 RX ORDER — NITROFURANTOIN 25; 75 MG/1; MG/1
100 CAPSULE ORAL 2 TIMES DAILY
Qty: 14 CAPSULE | Refills: 0 | Status: SHIPPED | OUTPATIENT
Start: 2020-08-05 | End: 2020-08-12

## 2020-08-06 ENCOUNTER — MYC REFILL (OUTPATIENT)
Dept: FAMILY MEDICINE | Facility: CLINIC | Age: 55
End: 2020-08-06

## 2020-08-06 DIAGNOSIS — I10 ESSENTIAL HYPERTENSION: ICD-10-CM

## 2020-08-06 RX ORDER — TRIAMTERENE/HYDROCHLOROTHIAZID 37.5-25 MG
1 TABLET ORAL DAILY
Qty: 90 TABLET | Refills: 0 | Status: SHIPPED | OUTPATIENT
Start: 2020-08-06 | End: 2020-10-19

## 2020-08-06 NOTE — TELEPHONE ENCOUNTER
Routing refill request to provider for review/approval because:  Labs out of range:  Creat.  Has apt scheduled 10/2020.  Please authorize if appropriate.  Thanks,  Morenita Galindo RN

## 2020-08-07 RX ORDER — TRIAMTERENE/HYDROCHLOROTHIAZID 37.5-25 MG
1 TABLET ORAL DAILY
Start: 2020-08-07

## 2020-10-01 DIAGNOSIS — R21 RASH: ICD-10-CM

## 2020-10-01 RX ORDER — TRIAMCINOLONE ACETONIDE 5 MG/G
CREAM TOPICAL PRN
Qty: 30 G | Refills: 11 | Status: SHIPPED | OUTPATIENT
Start: 2020-10-01 | End: 2023-07-03

## 2020-10-01 NOTE — TELEPHONE ENCOUNTER
Routing refill request to provider for review/approval because:  Drug not on the [facility] refill protocol  Please authorize if appropriate.  Thanks,  Morenita Galindo RN

## 2020-10-19 ENCOUNTER — OFFICE VISIT (OUTPATIENT)
Dept: FAMILY MEDICINE | Facility: CLINIC | Age: 55
End: 2020-10-19
Payer: COMMERCIAL

## 2020-10-19 VITALS
HEART RATE: 99 BPM | OXYGEN SATURATION: 100 % | DIASTOLIC BLOOD PRESSURE: 79 MMHG | SYSTOLIC BLOOD PRESSURE: 130 MMHG | BODY MASS INDEX: 30.35 KG/M2 | WEIGHT: 182.2 LBS | HEIGHT: 65 IN | TEMPERATURE: 98.3 F

## 2020-10-19 DIAGNOSIS — E03.8 SUBCLINICAL HYPOTHYROIDISM: ICD-10-CM

## 2020-10-19 DIAGNOSIS — C77.3 BREAST CANCER METASTASIZED TO AXILLARY LYMPH NODE, LEFT (H): ICD-10-CM

## 2020-10-19 DIAGNOSIS — I10 ESSENTIAL HYPERTENSION: ICD-10-CM

## 2020-10-19 DIAGNOSIS — M53.3 SI (SACROILIAC) JOINT DYSFUNCTION: ICD-10-CM

## 2020-10-19 DIAGNOSIS — Z90.13 S/P MASTECTOMY, BILATERAL: ICD-10-CM

## 2020-10-19 DIAGNOSIS — Z98.890 S/P DISCECTOMY: ICD-10-CM

## 2020-10-19 DIAGNOSIS — K75.81 NASH (NONALCOHOLIC STEATOHEPATITIS): ICD-10-CM

## 2020-10-19 DIAGNOSIS — R10.84 ABDOMINAL PAIN, GENERALIZED: ICD-10-CM

## 2020-10-19 DIAGNOSIS — C50.912 BREAST CANCER METASTASIZED TO AXILLARY LYMPH NODE, LEFT (H): ICD-10-CM

## 2020-10-19 DIAGNOSIS — Z23 NEED FOR PROPHYLACTIC VACCINATION AND INOCULATION AGAINST INFLUENZA: ICD-10-CM

## 2020-10-19 DIAGNOSIS — N39.0 ACUTE UTI (URINARY TRACT INFECTION): ICD-10-CM

## 2020-10-19 DIAGNOSIS — Z12.11 SPECIAL SCREENING FOR MALIGNANT NEOPLASMS, COLON: ICD-10-CM

## 2020-10-19 DIAGNOSIS — K21.00 GASTROESOPHAGEAL REFLUX DISEASE WITH ESOPHAGITIS WITHOUT HEMORRHAGE: ICD-10-CM

## 2020-10-19 DIAGNOSIS — E78.5 DYSLIPIDEMIA WITH ELEVATED LOW DENSITY LIPOPROTEIN (LDL) CHOLESTEROL AND ABNORMALLY LOW HIGH DENSITY LIPOPROTEIN CHOLESTEROL: ICD-10-CM

## 2020-10-19 DIAGNOSIS — Z00.00 ROUTINE GENERAL MEDICAL EXAMINATION AT A HEALTH CARE FACILITY: Primary | ICD-10-CM

## 2020-10-19 LAB
ERYTHROCYTE [DISTWIDTH] IN BLOOD BY AUTOMATED COUNT: 14.5 % (ref 10–15)
HCT VFR BLD AUTO: 45.8 % (ref 35–47)
HGB BLD-MCNC: 15.9 G/DL (ref 11.7–15.7)
MCH RBC QN AUTO: 29.6 PG (ref 26.5–33)
MCHC RBC AUTO-ENTMCNC: 34.7 G/DL (ref 31.5–36.5)
MCV RBC AUTO: 85 FL (ref 78–100)
PLATELET # BLD AUTO: 318 10E9/L (ref 150–450)
RBC # BLD AUTO: 5.38 10E12/L (ref 3.8–5.2)
VIT B12 SERPL-MCNC: 595 PG/ML (ref 193–986)
WBC # BLD AUTO: 6.8 10E9/L (ref 4–11)

## 2020-10-19 PROCEDURE — 87389 HIV-1 AG W/HIV-1&-2 AB AG IA: CPT | Performed by: INTERNAL MEDICINE

## 2020-10-19 PROCEDURE — 82306 VITAMIN D 25 HYDROXY: CPT | Performed by: INTERNAL MEDICINE

## 2020-10-19 PROCEDURE — 87624 HPV HI-RISK TYP POOLED RSLT: CPT | Performed by: INTERNAL MEDICINE

## 2020-10-19 PROCEDURE — 80061 LIPID PANEL: CPT | Performed by: INTERNAL MEDICINE

## 2020-10-19 PROCEDURE — 80053 COMPREHEN METABOLIC PANEL: CPT | Performed by: INTERNAL MEDICINE

## 2020-10-19 PROCEDURE — 82607 VITAMIN B-12: CPT | Performed by: INTERNAL MEDICINE

## 2020-10-19 PROCEDURE — 83735 ASSAY OF MAGNESIUM: CPT | Performed by: INTERNAL MEDICINE

## 2020-10-19 PROCEDURE — 36415 COLL VENOUS BLD VENIPUNCTURE: CPT | Performed by: INTERNAL MEDICINE

## 2020-10-19 PROCEDURE — 99396 PREV VISIT EST AGE 40-64: CPT | Mod: 25 | Performed by: INTERNAL MEDICINE

## 2020-10-19 PROCEDURE — G0145 SCR C/V CYTO,THINLAYER,RESCR: HCPCS | Performed by: INTERNAL MEDICINE

## 2020-10-19 PROCEDURE — 90471 IMMUNIZATION ADMIN: CPT | Performed by: INTERNAL MEDICINE

## 2020-10-19 PROCEDURE — 85027 COMPLETE CBC AUTOMATED: CPT | Performed by: INTERNAL MEDICINE

## 2020-10-19 PROCEDURE — 84443 ASSAY THYROID STIM HORMONE: CPT | Performed by: INTERNAL MEDICINE

## 2020-10-19 PROCEDURE — 90682 RIV4 VACC RECOMBINANT DNA IM: CPT | Performed by: INTERNAL MEDICINE

## 2020-10-19 PROCEDURE — 86803 HEPATITIS C AB TEST: CPT | Performed by: INTERNAL MEDICINE

## 2020-10-19 RX ORDER — PREGABALIN 75 MG/1
75 CAPSULE ORAL 2 TIMES DAILY
Qty: 90 CAPSULE | Refills: 3 | Status: CANCELLED | OUTPATIENT
Start: 2020-10-19

## 2020-10-19 RX ORDER — LISINOPRIL 10 MG/1
10 TABLET ORAL AT BEDTIME
Qty: 90 TABLET | Refills: 3 | Status: SHIPPED | OUTPATIENT
Start: 2020-10-19 | End: 2021-10-29

## 2020-10-19 RX ORDER — TRIAMTERENE/HYDROCHLOROTHIAZID 37.5-25 MG
1 TABLET ORAL DAILY
Qty: 90 TABLET | Refills: 3 | Status: SHIPPED | OUTPATIENT
Start: 2020-10-19 | End: 2021-07-09

## 2020-10-19 RX ORDER — NITROFURANTOIN 25; 75 MG/1; MG/1
100 CAPSULE ORAL 2 TIMES DAILY
Qty: 14 CAPSULE | Refills: 0 | Status: SHIPPED | OUTPATIENT
Start: 2020-10-19 | End: 2022-11-25

## 2020-10-19 RX ORDER — AMOXICILLIN 500 MG/1
2000 CAPSULE ORAL ONCE
Qty: 4 CAPSULE | Refills: 0 | COMMUNITY
Start: 2020-10-19

## 2020-10-19 RX ORDER — CYCLOBENZAPRINE HCL 10 MG
TABLET ORAL
Qty: 270 TABLET | Refills: 3 | Status: SHIPPED | OUTPATIENT
Start: 2020-10-19 | End: 2021-10-29

## 2020-10-19 ASSESSMENT — MIFFLIN-ST. JEOR: SCORE: 1422.33

## 2020-10-19 NOTE — PROGRESS NOTES
Patient:   CAS FARFAN            MRN: [id number]            FIN: [id number]              Age:   65 years     Sex:  FEMALE     :  [birth year]   Associated Diagnoses:   None   Author:   SHELBI DUKE     Postoperative Information     Postoperative Information   Postoperative Information:          Preoperative Diagnosis:    LEFT GLAUCOMA  .         Postoperative Diagnosis:    LEFT GLAUCOMA  .         Performed by:    SHELBI DUKE (Surgeon - Primary)  .         Assistant: None.         Surgical Tasks Performed by Assistant: None.         Procedures Performed:    Trabeculectomy Eye, LEFT TRABECULECTOMY WITH MITOMYCIN., Left  .         Findings: No Abnormal Findings.         Specimens Removed: None.         Estimated Blood Loss: 1  ml.         Blood Administered: No.         Complications: None.         Grafts/Implants: None.    Anesthetic utilized:     CHENTE ANDINO, CONRAD KAYE (Supervisor)  Charly Valiente (Provider)  .    PREOPERATIVE DIAGNOSIS: glaucoma, left eye  POSTOPERATIVE DIAGNOSIS: glaucoma, left eye  SURGEON: Shelbi Irizarry MD  ASSISTANTS: none  OPERATION: Trabeculectomy with Mitomycin C, left eye  EST BLOOD LOSS: <1cc  SPECIMENS: None  IMPLANTS: none  ANESTHESIA: Retrobulbar block with IV sedation  COMPLICATIONS: none  PROCEDURE: The patient was brought to the operating room, time out was performed.  IV sedation was administered and retrobulbar block was given using a total of approximately 3mL of 1:1 mixture of 2% lidocaine and 0.75% bupivacaine. Adequate anesthesia and akinesia were obtained.  The patient was prepped and draped in the usual sterile manner for ophthalmic surgery. A lid speculum was placed in the interpalpebral fissure.  A corneal 7-0 Silk traction suture was placed and used to infraduct the globe. _0.1 ml of 50:50 3mg/ml mitomycin C and 2%lidocaine mix (15mcg total MMC) was then injected beneath the Tenon’s layer at the superior fornix using a 30g needle, taking  This is a very pleasant 55 years old woman who had   SUBJECTIVE:   CC: Bertha Hollingsworth is an 55 year old woman who presents for preventive health visit.     Bilateral mastectomy because of left-sided breast cancer  She received chemotherapy and radiation for that  Her mother had ovarian cancer and patient has been genetically screened [facility] in the past  She has stable GERD and blood pressure is improved she has lot of SI joint pain    She has been exercising and eating right and has lost 22 pounds of weight  Patient has been advised of split billing requirements and indicates understanding: yes  Healthy Habits:    Getting at least 3 servings of Calcium per day:  Yes    Bi-annual eye exam:  Yes    Dental care twice a year:  Yes    Sleep apnea or symptoms of sleep apnea:  None    Diet:  Regular (no restrictions)    Frequency of exercise:  4-5 days/week    Duration of exercise:  30-45 minutes    Taking medications regularly:  Yes    Barriers to taking medications:  None    Medication side effects:  None    PHQ-2 Total Score:    Additional concerns today:  No          Hypertension Follow-up      Do you check your blood pressure regularly outside of the clinic? Yes     Are you following a low salt diet? Yes    Are your blood pressures ever more than 140 on the top number (systolic) OR more   than 90 on the bottom number (diastolic), for example 140/90? No      Today's PHQ-2 Score:   PHQ-2 ( 1999 Pfizer) 10/19/2020   Q1: Little interest or pleasure in doing things 0   Q2: Feeling down, depressed or hopeless 0   PHQ-2 Score 0       Abuse: Current or Past (Physical, Sexual or Emotional) - No  Do you feel safe in your environment? Yes    Have you ever done Advance Care Planning? (For example, a Health Directive, POLST, or a discussion with a medical provider or your loved ones about your wishes): No, advance care planning information given to patient to review.  Patient plans to discuss their wishes with loved ones or  care to avoid the superior rectus muscle. The conjunctiva was incised to create a peritomy at approximately 1mm from the superior limbus. Blunt dissection of Tenon's capsule from bare sclera was  carried out posteriorly. Cautery was used to establish hemostasis. A 50% thickness rectangular scleral flap was created using a crescent blade. The 15 degree blade was used to fashion a temporal paracentesis. 10-0 nylon suture x 2 was pre-placed over the scleral flap. The 15 degree blade was used to create a horizontal incision at the base of the scleral flap to enter the anterior chamber. A Carrol-Descemet punch was used to remove a piece of  cornea/trabecular meshwork. A peripheral iridotomy _was performed using Vannas scissors. The scleral flap was placed back on the bed and the 10-0 nylon sutures were tied to appropriate tension. The corneal traction suture was relaxed. The anterior chamber was filled with balanced salt solution (BSS) through the paracentesis site. Flow was checked and the suture tension was adjusted for slow  egress of aqueous.  The conjunctiva was re-approximated to the limbus with two 9-0 Nylon VAS sutures. Injection into the anterior chamber with BSS produced a diffuse, elevated bleb. There were no leaks identified with fluorescein testing. The traction suture was removed. The speculum was removed. Atropine drop was given. _Antibiotic ointment was applied and a patch and shield were placed over the operative eye. The patient tolerated the procedure well and left the  operating room in good condition.   provider.      Social History     Tobacco Use     Smoking status: Never Smoker     Smokeless tobacco: Never Used   Substance Use Topics     Alcohol use: Yes     Comment: rare maybe 1-2 drinks a YEAR          No flowsheet data found.    Reviewed orders with patient.  Reviewed health maintenance and updated orders accordingly - Yes  BP Readings from Last 3 Encounters:   10/19/20 130/79   11/04/19 139/88   06/21/19 134/86    Wt Readings from Last 3 Encounters:   10/19/20 82.6 kg (182 lb 3.2 oz)   11/04/19 83.9 kg (185 lb)   06/21/19 92.5 kg (204 lb)                  Patient Active Problem List   Diagnosis     Breast cancer metastasized to axillary lymph node, left (H)     S/P chemotherapy, time since greater than 12 weeks     S/P hemilaminotomy     S/P silicone breast implant     S/P mastectomy, bilateral     Family history of ovarian cancer     Dyslipidemia with elevated low density lipoprotein (LDL) cholesterol and abnormally low high density lipoprotein cholesterol     Subclinical hypothyroidism     Past Surgical History:   Procedure Laterality Date     AS LIGATE FALLOPIAN TUBE       C ANTERIOR LUMBAR DISCECTOMY      2001     HC REVISE BREAST RECONSTRUCTION      2015.2016     HYSTEROSCOPY      2014     TONSILLECTOMY         Social History     Tobacco Use     Smoking status: Never Smoker     Smokeless tobacco: Never Used   Substance Use Topics     Alcohol use: Yes     Comment: rare maybe 1-2 drinks a YEAR      Family History   Problem Relation Age of Onset     Ovarian Cancer Mother      Bipolar Disorder Sister      Diabetic Kidney Disease Maternal Grandmother          Current Outpatient Medications   Medication Sig Dispense Refill     amoxicillin (AMOXIL) 500 MG capsule Take 4 capsules (2,000 mg) by mouth once for 1 dose Before dental procedures 4 capsule 0     cyclobenzaprine (FLEXERIL) 10 MG tablet TAKE 1 TABLET THREE TIMES A DAY AS NEEDED FOR MUSCLE SPASMS 270 tablet 3     lisinopril (ZESTRIL) 10 MG tablet Take 1  "tablet (10 mg) by mouth At Bedtime 90 tablet 3     loratadine (CLARITIN) 10 MG tablet Take 1 tablet by mouth daily       nitroFURantoin macrocrystal-monohydrate (MACROBID) 100 MG capsule Take 1 capsule (100 mg) by mouth 2 times daily 14 capsule 0     omeprazole (PRILOSEC) 20 MG DR capsule Take 1 capsule (20 mg) by mouth 2 times daily 180 capsule 3     triamcinolone (ARISTOCORT HP) 0.5 % external cream APPLY TOPICALLY AS NEEDED 30 g 11     triamterene-HCTZ (MAXZIDE-25) 37.5-25 MG tablet Take 1 tablet by mouth daily 90 tablet 3     vitamin D3 (CHOLECALCIFEROL) 01844 units capsule Take 1 capsule (50,000 Units) by mouth once a week 12 capsule 0           Pertinent mammograms are reviewed under the imaging tab.  History of abnormal Pap smear: NO - age 30- 65 PAP every 3 years recommended     Reviewed and updated as needed this visit by clinical staff  Tobacco  Allergies  Meds              Reviewed and updated as needed this visit by Provider             Latrell will benefit from pain medicine consultation      Review of Systems  10 point ROS of systems including Constitutional, Eyes, Respiratory, Cardiovascular, Gastroenterology, Genitourinary, Integumentary, Muscularskeletal, Psychiatric were all negative except for pertinent positives noted in my HPI.       OBJECTIVE:   /79 (BP Location: Right arm, Patient Position: Sitting, Cuff Size: Adult Regular)   Pulse 99   Temp 98.3  F (36.8  C) (Tympanic)   Ht 1.651 m (5' 5\")   Wt 82.6 kg (182 lb 3.2 oz)   SpO2 100%   BMI 30.32 kg/m    Physical Exam  GENERAL APPEARANCE: healthy, alert and no distress  EYES: Eyes grossly normal to inspection, PERRL and conjunctivae and sclerae normal  HENT: ear canals and TM's normal, nose and mouth without ulcers or lesions, oropharynx clear and oral mucous membranes moist  NECK: no adenopathy, no asymmetry, masses, or scars and thyroid normal to palpation  RESP: lungs clear to auscultation - no rales, rhonchi or " wheezes  BREAST:implants no palpable axillary masses or adenopathy  CV: regular rate and rhythm, normal S1 S2, no S3 or S4, no murmur, click or rub, no peripheral edema and peripheral pulses strong  ABDOMEN: soft, nontender, no hepatosplenomegaly, no masses and bowel sounds normal  MS: no musculoskeletal defects are noted and gait is age appropriate without ataxia  SKIN: no suspicious lesions or rashes  NEURO: Normal strength and tone, sensory exam grossly normal, mentation intact and speech normal  PSYCH: mentation appears normal and affect normal/bright    Office Visit on 11/04/2019   Component Date Value Ref Range Status     Sodium 11/04/2019 139  133 - 144 mmol/L Final     Potassium 11/04/2019 3.3* 3.4 - 5.3 mmol/L Final     Chloride 11/04/2019 104  94 - 109 mmol/L Final     Carbon Dioxide 11/04/2019 27  20 - 32 mmol/L Final     Anion Gap 11/04/2019 8  3 - 14 mmol/L Final     Glucose 11/04/2019 97  70 - 99 mg/dL Final     Urea Nitrogen 11/04/2019 16  7 - 30 mg/dL Final     Creatinine 11/04/2019 0.87  0.52 - 1.04 mg/dL Final     GFR Estimate 11/04/2019 75  >60 mL/min/[1.73_m2] Final    Comment: Non  GFR Calc  Starting 12/18/2018, serum creatinine based estimated GFR (eGFR) will be   calculated using the Chronic Kidney Disease Epidemiology Collaboration   (CKD-EPI) equation.       GFR Estimate If Black 11/04/2019 87  >60 mL/min/[1.73_m2] Final    Comment:  GFR Calc  Starting 12/18/2018, serum creatinine based estimated GFR (eGFR) will be   calculated using the Chronic Kidney Disease Epidemiology Collaboration   (CKD-EPI) equation.       Calcium 11/04/2019 9.4  8.5 - 10.1 mg/dL Final     Bilirubin Total 11/04/2019 0.6  0.2 - 1.3 mg/dL Final     Albumin 11/04/2019 4.3  3.4 - 5.0 g/dL Final     Protein Total 11/04/2019 7.7  6.8 - 8.8 g/dL Final     Alkaline Phosphatase 11/04/2019 79  40 - 150 U/L Final     ALT 11/04/2019 51* 0 - 50 U/L Final     AST 11/04/2019 42  0 - 45 U/L Final      Cholesterol 11/04/2019 231* <200 mg/dL Final    Desirable:       <200 mg/dl     Triglycerides 11/04/2019 129  <150 mg/dL Final     HDL Cholesterol 11/04/2019 62  >49 mg/dL Final     LDL Cholesterol Calculated 11/04/2019 143* <100 mg/dL Final    Comment: Above desirable:  100-129 mg/dl  Borderline High:  130-159 mg/dL  High:             160-189 mg/dL  Very high:       >189 mg/dl       Non HDL Cholesterol 11/04/2019 169* <130 mg/dL Final    Comment: Above Desirable:  130-159 mg/dl  Borderline high:  160-189 mg/dl  High:             190-219 mg/dl  Very high:       >219 mg/dl       Hemoglobin A1C 11/04/2019 5.4  0 - 5.6 % Final    Comment: Normal <5.7% Prediabetes 5.7-6.4%  Diabetes 6.5% or higher - adopted from ADA   consensus guidelines.       Vitamin D Deficiency screening 11/04/2019 77* 20 - 75 ug/L Final    Comment: Season, race, dietary intake, and treatment affect the concentration of   25-hydroxy-Vitamin D. Values may decrease during winter months and increase   during summer months. Values 20-29 ug/L may indicate Vitamin D insufficiency   and values <20 ug/L may indicate Vitamin D deficiency.  Vitamin D determination is routinely performed by an immunoassay specific for   25 hydroxyvitamin D3.  If an individual is on vitamin D2 (ergocalciferol)   supplementation, please specify 25 OH vitamin D2 and D3 level determination by   LCMSMS test [id number].       TSH 11/04/2019 2.62  0.40 - 4.00 mU/L Final     WBC 11/04/2019 8.3  4.0 - 11.0 10e9/L Final     RBC Count 11/04/2019 5.61* 3.8 - 5.2 10e12/L Final     Hemoglobin 11/04/2019 15.8* 11.7 - 15.7 g/dL Final     Hematocrit 11/04/2019 46.7  35.0 - 47.0 % Final     MCV 11/04/2019 83  78 - 100 fl Final     MCH 11/04/2019 28.2  26.5 - 33.0 pg Final     MCHC 11/04/2019 33.8  31.5 - 36.5 g/dL Final     RDW 11/04/2019 16.1* 10.0 - 15.0 % Final     Platelet Count 11/04/2019 277  150 - 450 10e9/L Final         ASSESSMENT/PLAN:   Bertha was seen today for physical and  imm/inj.    Diagnoses and all orders for this visit:    Routine general medical examination at a health care facility  -     Hepatitis C antibody  -     Pap imaged thin layer screen with HPV - recommended age 30 - 65 years (select HPV order below)  -     HPV High Risk Types DNA Cervical  -     HIV Antigen Antibody Combo    Essential hypertension  -     lisinopril (ZESTRIL) 10 MG tablet; Take 1 tablet (10 mg) by mouth At Bedtime  -     triamterene-HCTZ (MAXZIDE-25) 37.5-25 MG tablet; Take 1 tablet by mouth daily    Subclinical hypothyroidism  -     TSH with free T4 reflex    Breast cancer metastasized to axillary lymph node, left (H)    S/P mastectomy, bilateral    AGUILAR (nonalcoholic steatohepatitis)  -     CBC with platelets  -     TSH with free T4 reflex  -     Comprehensive metabolic panel  -     Lipid panel reflex to direct LDL Fasting  -     Vitamin D Deficiency  -     Vitamin B12    S/P discectomy  -     cyclobenzaprine (FLEXERIL) 10 MG tablet; TAKE 1 TABLET THREE TIMES A DAY AS NEEDED FOR MUSCLE SPASMS    Dyslipidemia with elevated low density lipoprotein (LDL) cholesterol and abnormally low high density lipoprotein cholesterol    Gastroesophageal reflux disease with esophagitis without hemorrhage  -     CBC with platelets  -     Vitamin B12  -     omeprazole (PRILOSEC) 20 MG DR capsule; Take 1 capsule (20 mg) by mouth 2 times daily  -     Magnesium    S/P discectomy  Comments:  left sided mammogram  Orders:  -     cyclobenzaprine (FLEXERIL) 10 MG tablet; TAKE 1 TABLET THREE TIMES A DAY AS NEEDED FOR MUSCLE SPASMS    Acute UTI (urinary tract infection)  -     nitroFURantoin macrocrystal-monohydrate (MACROBID) 100 MG capsule; Take 1 capsule (100 mg) by mouth 2 times daily    Abdominal pain, generalized    Special screening for malignant neoplasms, colon  -     Fecal colorectal cancer screen (FIT); Future    Need for prophylactic vaccination and inoculation against influenza  -     INFLUENZA QUAD, RECOMBINANT,  "P-FREE (RIV4) (FLUBLOCK) [09078]  -     Vaccine Administration, Initial [22353]    SI (sacroiliac) joint dysfunction  -     [facility] PT, HAND, AND CHIROPRACTIC REFERRAL; Future  -     PAIN MANAGEMENT REFERRAL; Future    For SI joint injections Latrell will consult pain medicine,    She does not want to do colonoscopy and will do stool occult blood testing  She will continue omeprazole  She will keep nitrofurantoin and hand a Pap smear was taken today    Influenza vaccination was given and Shingrix will be considered next week weight loss should help her liver functions and Sears  We will check magnesium and B12 also  We will continue lisinopril and diuretic for hypertension    Patient has been advised of split billing requirements and indicates understanding: Yes  COUNSELING:  Reviewed preventive health counseling, as reflected in patient instructions       Regular exercise       Healthy diet/nutrition       Hearing screening    Estimated body mass index is 30.32 kg/m  as calculated from the following:    Height as of this encounter: 1.651 m (5' 5\").    Weight as of this encounter: 82.6 kg (182 lb 3.2 oz).        She reports that she has never smoked. She has never used smokeless tobacco.      Counseling Resources:  ATP IV Guidelines  Pooled Cohorts Equation Calculator  Breast Cancer Risk Calculator  BRCA-Related Cancer Risk Assessment: FHS-7 Tool  FRAX Risk Assessment  ICSI Preventive Guidelines  Dietary Guidelines for Americans, 2010  USDA's MyPlate  ASA Prophylaxis  Lung CA Screening    Zaira Schultz MD  [facility]  "

## 2020-10-20 ENCOUNTER — TELEPHONE (OUTPATIENT)
Dept: PALLIATIVE MEDICINE | Facility: CLINIC | Age: 55
End: 2020-10-20

## 2020-10-20 LAB
ALBUMIN SERPL-MCNC: 4 G/DL (ref 3.4–5)
ALP SERPL-CCNC: 80 U/L (ref 40–150)
ALT SERPL W P-5'-P-CCNC: 49 U/L (ref 0–50)
ANION GAP SERPL CALCULATED.3IONS-SCNC: 7 MMOL/L (ref 3–14)
AST SERPL W P-5'-P-CCNC: 41 U/L (ref 0–45)
BILIRUB SERPL-MCNC: 0.6 MG/DL (ref 0.2–1.3)
BUN SERPL-MCNC: 17 MG/DL (ref 7–30)
CALCIUM SERPL-MCNC: 10 MG/DL (ref 8.5–10.1)
CHLORIDE SERPL-SCNC: 103 MMOL/L (ref 94–109)
CHOLEST SERPL-MCNC: 209 MG/DL
CO2 SERPL-SCNC: 29 MMOL/L (ref 20–32)
CREAT SERPL-MCNC: 0.76 MG/DL (ref 0.52–1.04)
DEPRECATED CALCIDIOL+CALCIFEROL SERPL-MC: 23 UG/L (ref 20–75)
GFR SERPL CREATININE-BSD FRML MDRD: 89 ML/MIN/{1.73_M2}
GLUCOSE SERPL-MCNC: 86 MG/DL (ref 70–99)
HCV AB SERPL QL IA: NONREACTIVE
HDLC SERPL-MCNC: 69 MG/DL
HIV 1+2 AB+HIV1 P24 AG SERPL QL IA: NONREACTIVE
LDLC SERPL CALC-MCNC: 112 MG/DL
MAGNESIUM SERPL-MCNC: 2.3 MG/DL (ref 1.6–2.3)
NONHDLC SERPL-MCNC: 140 MG/DL
POTASSIUM SERPL-SCNC: 3.7 MMOL/L (ref 3.4–5.3)
PROT SERPL-MCNC: 7.8 G/DL (ref 6.8–8.8)
SODIUM SERPL-SCNC: 139 MMOL/L (ref 133–144)
TRIGL SERPL-MCNC: 140 MG/DL
TSH SERPL DL<=0.005 MIU/L-ACNC: 3.55 MU/L (ref 0.4–4)

## 2020-10-20 NOTE — TELEPHONE ENCOUNTER

## 2020-10-21 LAB
COPATH REPORT: NORMAL
PAP: NORMAL

## 2020-10-23 LAB
FINAL DIAGNOSIS: NORMAL
HPV HR 12 DNA CVX QL NAA+PROBE: NEGATIVE
HPV16 DNA SPEC QL NAA+PROBE: NEGATIVE
HPV18 DNA SPEC QL NAA+PROBE: NEGATIVE
SPECIMEN DESCRIPTION: NORMAL
SPECIMEN SOURCE CVX/VAG CYTO: NORMAL

## 2021-03-18 ENCOUNTER — IMMUNIZATION (OUTPATIENT)
Dept: NURSING | Facility: CLINIC | Age: 56
End: 2021-03-18
Payer: COMMERCIAL

## 2021-03-18 PROCEDURE — 91300 PR COVID VAC PFIZER DIL RECON 30 MCG/0.3 ML IM: CPT

## 2021-03-18 PROCEDURE — 0001A PR COVID VAC PFIZER DIL RECON 30 MCG/0.3 ML IM: CPT

## 2021-04-08 ENCOUNTER — IMMUNIZATION (OUTPATIENT)
Dept: NURSING | Facility: CLINIC | Age: 56
End: 2021-04-08
Attending: INTERNAL MEDICINE
Payer: COMMERCIAL

## 2021-04-08 PROCEDURE — 91300 PR COVID VAC PFIZER DIL RECON 30 MCG/0.3 ML IM: CPT

## 2021-04-08 PROCEDURE — 0002A PR COVID VAC PFIZER DIL RECON 30 MCG/0.3 ML IM: CPT

## 2021-07-08 DIAGNOSIS — I10 ESSENTIAL HYPERTENSION: ICD-10-CM

## 2021-07-09 RX ORDER — TRIAMTERENE/HYDROCHLOROTHIAZID 37.5-25 MG
TABLET ORAL
Qty: 90 TABLET | Refills: 0 | Status: SHIPPED | OUTPATIENT
Start: 2021-07-09 | End: 2021-11-05

## 2021-10-10 ENCOUNTER — HEALTH MAINTENANCE LETTER (OUTPATIENT)
Age: 56
End: 2021-10-10

## 2021-10-25 ASSESSMENT — ENCOUNTER SYMPTOMS
DYSURIA: 0
PALPITATIONS: 0
FREQUENCY: 0
HEADACHES: 0
COUGH: 0
NAUSEA: 0
CONSTIPATION: 0
DIZZINESS: 0
MYALGIAS: 0
WEAKNESS: 0
ARTHRALGIAS: 1
HEMATOCHEZIA: 0
ABDOMINAL PAIN: 0
JOINT SWELLING: 0
HEARTBURN: 0
BREAST MASS: 0
HEMATURIA: 0
CHILLS: 0
EYE PAIN: 0
SORE THROAT: 0
PARESTHESIAS: 0
NERVOUS/ANXIOUS: 0
SHORTNESS OF BREATH: 0
FEVER: 0
DIARRHEA: 0

## 2021-10-29 ENCOUNTER — ANCILLARY PROCEDURE (OUTPATIENT)
Dept: GENERAL RADIOLOGY | Facility: CLINIC | Age: 56
End: 2021-10-29
Attending: INTERNAL MEDICINE
Payer: COMMERCIAL

## 2021-10-29 ENCOUNTER — OFFICE VISIT (OUTPATIENT)
Dept: FAMILY MEDICINE | Facility: CLINIC | Age: 56
End: 2021-10-29
Payer: COMMERCIAL

## 2021-10-29 VITALS
HEART RATE: 99 BPM | OXYGEN SATURATION: 99 % | HEIGHT: 65 IN | DIASTOLIC BLOOD PRESSURE: 82 MMHG | BODY MASS INDEX: 31.82 KG/M2 | WEIGHT: 191 LBS | SYSTOLIC BLOOD PRESSURE: 124 MMHG

## 2021-10-29 DIAGNOSIS — Z23 HIGH PRIORITY FOR 2019-NCOV VACCINE: ICD-10-CM

## 2021-10-29 DIAGNOSIS — C77.3 BREAST CANCER METASTASIZED TO AXILLARY LYMPH NODE, LEFT (H): ICD-10-CM

## 2021-10-29 DIAGNOSIS — C50.912 BREAST CANCER METASTASIZED TO AXILLARY LYMPH NODE, LEFT (H): ICD-10-CM

## 2021-10-29 DIAGNOSIS — Z12.11 SPECIAL SCREENING FOR MALIGNANT NEOPLASMS, COLON: ICD-10-CM

## 2021-10-29 DIAGNOSIS — R05.9 COUGH: ICD-10-CM

## 2021-10-29 DIAGNOSIS — R20.0 NUMBNESS OF LEFT FOOT: ICD-10-CM

## 2021-10-29 DIAGNOSIS — Z92.21 S/P CHEMOTHERAPY, TIME SINCE GREATER THAN 12 WEEKS: ICD-10-CM

## 2021-10-29 DIAGNOSIS — Z98.890 S/P HEMILAMINOTOMY: ICD-10-CM

## 2021-10-29 DIAGNOSIS — Z00.00 ROUTINE GENERAL MEDICAL EXAMINATION AT A HEALTH CARE FACILITY: Primary | ICD-10-CM

## 2021-10-29 DIAGNOSIS — E78.5 DYSLIPIDEMIA WITH ELEVATED LOW DENSITY LIPOPROTEIN (LDL) CHOLESTEROL AND ABNORMALLY LOW HIGH DENSITY LIPOPROTEIN CHOLESTEROL: ICD-10-CM

## 2021-10-29 DIAGNOSIS — I10 ESSENTIAL HYPERTENSION: ICD-10-CM

## 2021-10-29 DIAGNOSIS — E03.8 SUBCLINICAL HYPOTHYROIDISM: ICD-10-CM

## 2021-10-29 DIAGNOSIS — K21.00 GASTROESOPHAGEAL REFLUX DISEASE WITH ESOPHAGITIS WITHOUT HEMORRHAGE: ICD-10-CM

## 2021-10-29 DIAGNOSIS — Z23 NEED FOR PROPHYLACTIC VACCINATION AND INOCULATION AGAINST INFLUENZA: ICD-10-CM

## 2021-10-29 DIAGNOSIS — Z98.890 S/P DISCECTOMY: ICD-10-CM

## 2021-10-29 DIAGNOSIS — K75.81 NASH (NONALCOHOLIC STEATOHEPATITIS): ICD-10-CM

## 2021-10-29 LAB
ALBUMIN SERPL-MCNC: 3.8 G/DL (ref 3.4–5)
ALP SERPL-CCNC: 75 U/L (ref 40–150)
ALT SERPL W P-5'-P-CCNC: 47 U/L (ref 0–50)
ANION GAP SERPL CALCULATED.3IONS-SCNC: 8 MMOL/L (ref 3–14)
AST SERPL W P-5'-P-CCNC: 36 U/L (ref 0–45)
BILIRUB SERPL-MCNC: 0.3 MG/DL (ref 0.2–1.3)
BUN SERPL-MCNC: 27 MG/DL (ref 7–30)
CALCIUM SERPL-MCNC: 9.8 MG/DL (ref 8.5–10.1)
CHLORIDE BLD-SCNC: 109 MMOL/L (ref 94–109)
CHOLEST SERPL-MCNC: 200 MG/DL
CO2 SERPL-SCNC: 23 MMOL/L (ref 20–32)
CREAT SERPL-MCNC: 0.9 MG/DL (ref 0.52–1.04)
CRP SERPL HS-MCNC: 1.3 MG/L
DEPRECATED CALCIDIOL+CALCIFEROL SERPL-MC: 37 UG/L (ref 20–75)
ERYTHROCYTE [DISTWIDTH] IN BLOOD BY AUTOMATED COUNT: 14.9 % (ref 10–15)
FASTING STATUS PATIENT QL REPORTED: YES
GFR SERPL CREATININE-BSD FRML MDRD: 72 ML/MIN/1.73M2
GLUCOSE BLD-MCNC: 103 MG/DL (ref 70–99)
HCT VFR BLD AUTO: 43.9 % (ref 35–47)
HDLC SERPL-MCNC: 65 MG/DL
HGB BLD-MCNC: 14.9 G/DL (ref 11.7–15.7)
LDLC SERPL CALC-MCNC: 116 MG/DL
MCH RBC QN AUTO: 29.1 PG (ref 26.5–33)
MCHC RBC AUTO-ENTMCNC: 33.9 G/DL (ref 31.5–36.5)
MCV RBC AUTO: 86 FL (ref 78–100)
NONHDLC SERPL-MCNC: 135 MG/DL
PLATELET # BLD AUTO: 286 10E3/UL (ref 150–450)
POTASSIUM BLD-SCNC: 4 MMOL/L (ref 3.4–5.3)
PROT SERPL-MCNC: 7.3 G/DL (ref 6.8–8.8)
RBC # BLD AUTO: 5.12 10E6/UL (ref 3.8–5.2)
SODIUM SERPL-SCNC: 140 MMOL/L (ref 133–144)
TRIGL SERPL-MCNC: 94 MG/DL
TSH SERPL DL<=0.005 MIU/L-ACNC: 3.19 MU/L (ref 0.4–4)
VIT B12 SERPL-MCNC: 467 PG/ML (ref 193–986)
WBC # BLD AUTO: 6.5 10E3/UL (ref 4–11)

## 2021-10-29 PROCEDURE — 86141 C-REACTIVE PROTEIN HS: CPT | Performed by: INTERNAL MEDICINE

## 2021-10-29 PROCEDURE — 80061 LIPID PANEL: CPT | Performed by: INTERNAL MEDICINE

## 2021-10-29 PROCEDURE — 90471 IMMUNIZATION ADMIN: CPT | Performed by: INTERNAL MEDICINE

## 2021-10-29 PROCEDURE — 0004A COVID-19,PF,PFIZER (12+ YRS): CPT | Performed by: INTERNAL MEDICINE

## 2021-10-29 PROCEDURE — 80053 COMPREHEN METABOLIC PANEL: CPT | Performed by: INTERNAL MEDICINE

## 2021-10-29 PROCEDURE — 85027 COMPLETE CBC AUTOMATED: CPT | Performed by: INTERNAL MEDICINE

## 2021-10-29 PROCEDURE — 99396 PREV VISIT EST AGE 40-64: CPT | Mod: 25 | Performed by: INTERNAL MEDICINE

## 2021-10-29 PROCEDURE — 82607 VITAMIN B-12: CPT | Performed by: INTERNAL MEDICINE

## 2021-10-29 PROCEDURE — 91300 COVID-19,PF,PFIZER (12+ YRS): CPT | Performed by: INTERNAL MEDICINE

## 2021-10-29 PROCEDURE — 90682 RIV4 VACC RECOMBINANT DNA IM: CPT | Performed by: INTERNAL MEDICINE

## 2021-10-29 PROCEDURE — 71046 X-RAY EXAM CHEST 2 VIEWS: CPT | Performed by: RADIOLOGY

## 2021-10-29 PROCEDURE — 82306 VITAMIN D 25 HYDROXY: CPT | Performed by: INTERNAL MEDICINE

## 2021-10-29 PROCEDURE — 36415 COLL VENOUS BLD VENIPUNCTURE: CPT | Performed by: INTERNAL MEDICINE

## 2021-10-29 PROCEDURE — 84443 ASSAY THYROID STIM HORMONE: CPT | Performed by: INTERNAL MEDICINE

## 2021-10-29 RX ORDER — LIDOCAINE/PRILOCAINE 2.5 %-2.5%
CREAM (GRAM) TOPICAL DAILY
Qty: 30 G | Refills: 4 | Status: SHIPPED | OUTPATIENT
Start: 2021-10-29 | End: 2022-11-25

## 2021-10-29 RX ORDER — CYCLOBENZAPRINE HCL 10 MG
TABLET ORAL
Qty: 270 TABLET | Refills: 3 | Status: SHIPPED | OUTPATIENT
Start: 2021-10-29 | End: 2022-11-25

## 2021-10-29 RX ORDER — LISINOPRIL 10 MG/1
10 TABLET ORAL AT BEDTIME
Qty: 90 TABLET | Refills: 3 | Status: SHIPPED | OUTPATIENT
Start: 2021-10-29 | End: 2022-11-15

## 2021-10-29 ASSESSMENT — MIFFLIN-ST. JEOR: SCORE: 1457.25

## 2021-10-29 NOTE — PROGRESS NOTES
Answers for HPI/ROS submitted by the patient on 10/25/2021  Frequency of exercise:: 4-5 days/week  Getting at least 3 servings of Calcium per day:: Yes  Diet:: Regular (no restrictions)  Taking medications regularly:: Yes  Medication side effects:: None  Bi-annual eye exam:: Yes  Dental care twice a year:: Yes  Sleep apnea or symptoms of sleep apnea:: None  abdominal pain: No  Blood in stool: No  Blood in urine: No  chest pain: No  chills: No  congestion: No  constipation: No  cough: No  diarrhea: No  dizziness: No  ear pain: No  eye pain: No  nervous/anxious: No  fever: No  frequency: No  genital sores: No  headaches: No  hearing loss: No  heartburn: No  arthralgias: Yes  joint swelling: No  peripheral edema: No  mood changes: No  myalgias: No  nausea: No  dysuria: No  palpitations: No  Skin sensation changes: No  sore throat: No  urgency: No  rash: No  shortness of breath: No  visual disturbance: No  weakness: No  pelvic pain: No  vaginal bleeding: No  vaginal discharge: No  tenderness: No  breast mass: No  breast discharge: No  Additional concerns today:: Yes  Duration of exercise:: 30-45 minutes

## 2021-10-29 NOTE — PROGRESS NOTES
SUBJECTIVE:   CC: Bertha Hollingsworth is an 56 year old woman who presents for preventive health visit.   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 s/p bilateral modified radical mastectomy and breast implants  She has history of laminectomy with left-sided foot drop  And chronic back pain  Now her left foot has tingling and numbness at times throughout the day and she is uncomfortable  She does not have anxiety or depression  She remains in remission in regards to breast cancer  She has family history of ovarian cancer  She has hypertension and GERD which are in good control and has lost about 15 pounds of weight total since I started to follow her up  Her breast cancer was metastatic to axillary lymph nodes on the left side and she was given chemotherapy for 12 weeks and also radiation  She has no lymphedema  Patient has been advised of split billing requirements and indicates understanding: Yes  Healthy Habits:     Getting at least 3 servings of Calcium per day:  Yes    Bi-annual eye exam:  Yes    Dental care twice a year:  Yes    Sleep apnea or symptoms of sleep apnea:  None    Diet:  Regular (no restrictions)    Frequency of exercise:  4-5 days/week    Duration of exercise:  30-45 minutes    Taking medications regularly:  Yes    Medication side effects:  None    PHQ-2 Total Score: 0    Additional concerns today:  Yes              Today's PHQ-2 Score:   PHQ-2 ( 1999 Pfizer) 10/25/2021   Q1: Little interest or pleasure in doing things 0   Q2: Feeling down, depressed or hopeless 0   PHQ-2 Score 0   Q1: Little interest or pleasure in doing things Not at all   Q2: Feeling down, depressed or hopeless Not at all   PHQ-2 Score 0       Abuse: Current or Past (Physical, Sexual or Emotional) - No  Do you feel safe in your environment? Yes        Social History     Tobacco Use     Smoking status: Never Smoker     Smokeless tobacco: Never Used   Substance Use Topics     Alcohol use: Yes      Comment: rare maybe 1-2 drinks a YEAR      If you drink alcohol do you typically have >3 drinks per day or >7 drinks per week? No    Alcohol Use 10/29/2021   Prescreen: >3 drinks/day or >7 drinks/week? -   Prescreen: >3 drinks/day or >7 drinks/week? No       Reviewed orders with patient.  Reviewed health maintenance and updated orders accordingly - Yes  BP Readings from Last 3 Encounters:   10/29/21 124/82   10/19/20 130/79   11/04/19 139/88    Wt Readings from Last 3 Encounters:   10/29/21 86.6 kg (191 lb)   10/19/20 82.6 kg (182 lb 3.2 oz)   11/04/19 83.9 kg (185 lb)                  Patient Active Problem List   Diagnosis     Breast cancer metastasized to axillary lymph node, left (H)     S/P chemotherapy, time since greater than 12 weeks     S/P hemilaminotomy     S/P silicone breast implant     S/P mastectomy, bilateral     Family history of ovarian cancer     Dyslipidemia with elevated low density lipoprotein (LDL) cholesterol and abnormally low high density lipoprotein cholesterol     Subclinical hypothyroidism     Past Surgical History:   Procedure Laterality Date     AS LIGATE FALLOPIAN TUBE       C ANTERIOR LUMBAR DISCECTOMY      2001     HC REVISE BREAST RECONSTRUCTION      2015.2016     HYSTEROSCOPY      2014     TONSILLECTOMY         Social History     Tobacco Use     Smoking status: Never Smoker     Smokeless tobacco: Never Used   Substance Use Topics     Alcohol use: Yes     Comment: rare maybe 1-2 drinks a YEAR      Family History   Problem Relation Age of Onset     Ovarian Cancer Mother      Bipolar Disorder Sister      Diabetic Kidney Disease Maternal Grandmother          Current Outpatient Medications   Medication Sig Dispense Refill     cyclobenzaprine (FLEXERIL) 10 MG tablet TAKE 1 TABLET THREE TIMES A DAY AS NEEDED FOR MUSCLE SPASMS 270 tablet 3     lisinopril (ZESTRIL) 10 MG tablet Take 1 tablet (10 mg) by mouth At Bedtime 90 tablet 3     omeprazole (PRILOSEC) 20 MG DR capsule Take 1 capsule  (20 mg) by mouth 2 times daily 180 capsule 3     amoxicillin (AMOXIL) 500 MG capsule Take 4 capsules (2,000 mg) by mouth once for 1 dose Before dental procedures 4 capsule 0     loratadine (CLARITIN) 10 MG tablet Take 1 tablet by mouth daily       nitroFURantoin macrocrystal-monohydrate (MACROBID) 100 MG capsule Take 1 capsule (100 mg) by mouth 2 times daily 14 capsule 0     triamcinolone (ARISTOCORT HP) 0.5 % external cream APPLY TOPICALLY AS NEEDED 30 g 11     triamterene-HCTZ (MAXZIDE-25) 37.5-25 MG tablet TAKE 1 TABLET DAILY 90 tablet 0     vitamin D3 (CHOLECALCIFEROL) 74071 units capsule Take 1 capsule (50,000 Units) by mouth once a week 12 capsule 0       Breast Cancer Screening:    FHS-7:   Breast CA Risk Assessment (FHS-7) 10/25/2021   Did any of your relatives have bilateral breast cancer? No   Did any man in your family have breast cancer? No   Did any woman in your family have breast cancer before age 50 y? No   Do you have 2 or more relatives with breast and/or bowel cancer? No       s/p MRM  Pertinent mammograms are reviewed under the imaging tab.    History of abnormal Pap smear:. Health Maintenance and Surgical History updated.  PAP / HPV Latest Ref Rng & Units 10/19/2020   PAP (Historical) - NIL   HPV16 NEG:Negative Negative   HPV18 NEG:Negative Negative   HRHPV NEG:Negative Negative     Reviewed and updated as needed this visit by clinical staff  Tobacco   Meds  Problems  Med Hx  Surg Hx  Fam Hx          Reviewed and updated as needed this visit by Provider    Meds  Problems  Med Hx  Surg Hx  Fam Hx         Past Medical History:   Diagnosis Date     Breast cancer metastasized to axillary lymph node, left (H)     2014     Dyslipidemia with elevated low density lipoprotein (LDL) cholesterol and abnormally low high density lipoprotein cholesterol      Family history of ovarian cancer      Prediabetes      S/P chemotherapy, time since greater than 12 weeks      S/P hemilaminotomy      S/P  "mastectomy, bilateral      S/P silicone breast implant       Past Surgical History:   Procedure Laterality Date     AS LIGATE FALLOPIAN TUBE       C ANTERIOR LUMBAR DISCECTOMY      2001     HC REVISE BREAST RECONSTRUCTION      2015.2016     HYSTEROSCOPY      2014     TONSILLECTOMY         Review of Systems  10 point ROS of systems including Constitutional, Eyes, Respiratory, Cardiovascular, Gastroenterology, Genitourinary, Integumentary, Muscularskeletal, Psychiatric were all negative except for pertinent positives noted in my HPI.       OBJECTIVE:   /82 (BP Location: Right arm, Patient Position: Sitting, Cuff Size: Adult Regular)   Pulse 99   Ht 1.651 m (5' 5\")   Wt 86.6 kg (191 lb)   SpO2 99%   BMI 31.78 kg/m    Physical Exam  GENERAL APPEARANCE: healthy, alert and no distress  EYES: Eyes grossly normal to inspection, PERRL and conjunctivae and sclerae normal  HENT: ear canals and TM's normal, nose and mouth without ulcers or lesions, oropharynx clear and oral mucous membranes moist  NECK: no adenopathy, no asymmetry, masses, or scars and thyroid normal to palpation  RESP: lungs clear to auscultation - no rales, rhonchi or wheezes  BREAST: Implants are normal without masses, tendernessand no palpable axillary masses or adenopathy  There are radiation changes to the left chest wall and radiation marker is present  CV: regular rate and rhythm, normal S1 S2, no S3 or S4, no murmur, click or rub, no peripheral edema and peripheral pulses strong  ABDOMEN: soft, nontender, no hepatosplenomegaly, no masses and bowel sounds normal  MS: no musculoskeletal defects are noted and gait is age appropriate without ataxia  SKIN: no suspicious lesions or rashes  NEURO: Except for the left foot drop normal strength and tone, sensory exam grossly normal, mentation intact and speech normal  PSYCH: mentation appears normal and affect normal/bright    On the left foot, patient has no sensation and cannot feel the " microfilament  Position is normal  Left foot is slightly colder than the right    ASSESSMENT/PLAN:   Bertha was seen today for imm/inj and imm/inj.    Diagnoses and all orders for this visit:    Routine general medical examination at a health care facility  Latrell is 56 years old breast cancer survivor and family history of ovarian cancer  She does not have any genetic disorder  She is otherwise doing well except the numbness in the left foot  Does not want to do colonoscopy and will do Cologuard  Vaccinations were updated today and she is exercising and eating right  Essential hypertension  -     CBC with Platelets  ; Future  -     lisinopril (ZESTRIL) 10 MG tablet; Take 1 tablet (10 mg) by mouth At Bedtime  She did have some dry cough but has improved  Numbness of left foot  We will try topicals and see if she can get comfortable  -     lidocaine-prilocaine (EMLA) 2.5-2.5 % external cream; Apply topically daily  -     diclofenac (VOLTAREN) 1 % topical gel; Apply 4 g topically 4 times daily    AGUILAR (nonalcoholic steatohepatitis)  -     Comprehensive metabolic panel; Future  Has lost weight and I hope that this has improved  Subclinical hypothyroidism  -     TSH with free T4 reflex; Future    Breast cancer metastasized to axillary lymph node, left (H)  Remains in remission and implants need to come out some day or she could get a tattoo  S/P hemilaminotomy  Surgical scar is healthy and back pain is stable except for occasional Flexeril  S/P chemotherapy, time since greater than 12 weeks    -     Vitamin B12; Future  -     Vitamin D Deficiency; Future    Dyslipidemia with elevated low density lipoprotein (LDL) cholesterol and abnormally low high density lipoprotein cholesterol  We may have to do CT coronary calcium score  -     Lipid panel reflex to direct LDL Fasting; Future  -     Vitamin D Deficiency; Future  -     CRP, CARDIAC RISK    Gastroesophageal reflux disease with esophagitis without hemorrhage stable on  "PPIs  -     CBC with Platelets  ; Future  -     Vitamin B12; Future  -     Vitamin D Deficiency; Future  -     omeprazole (PRILOSEC) 20 MG DR capsule; Take 1 capsule (20 mg) by mouth 2 times daily    Cough this is for 2 months and improving  -     XR Chest 2 Views; Future    S/P discectomy patient also has left-sided foot drop  -     cyclobenzaprine (FLEXERIL) 10 MG tablet; TAKE 1 TABLET THREE TIMES A DAY AS NEEDED FOR MUSCLE SPASMS    Special screening for malignant neoplasms, colon  -     COLOGUARD(EXACT SCIENCES)    Need for prophylactic vaccination and inoculation against influenza  -     INFLUENZA QUAD, RECOMBINANT, P-FREE (RIV4) (FLUBLOK)  -     VACCINE ADMINISTRATION, INITIAL    High priority for 2019-nCoV vaccine  -     COVID-19,PF,PFIZER (12+ Yrs)  -     IMMUNIATION ADMIN EACH ADDT'          COUNSELING:  Reviewed preventive health counseling, as reflected in patient instructions       Vaccinated for influenza and cold and will take Shingrix in the pharmacy    Estimated body mass index is 31.78 kg/m  as calculated from the following:    Height as of this encounter: 1.651 m (5' 5\").    Weight as of this encounter: 86.6 kg (191 lb).    Weight management plan: Discussed healthy diet and exercise guidelines    She reports that she has never smoked. She has never used smokeless tobacco.      Counseling Resources:  ATP IV Guidelines  Pooled Cohorts Equation Calculator  Breast Cancer Risk Calculator  BRCA-Related Cancer Risk Assessment: FHS-7 Tool  FRAX Risk Assessment  ICSI Preventive Guidelines  Dietary Guidelines for Americans, 2010  USDA's MyPlate  ASA Prophylaxis  Lung CA Screening    Zaira Schultz MD  [facility]  "

## 2021-11-03 DIAGNOSIS — I10 ESSENTIAL HYPERTENSION: ICD-10-CM

## 2021-11-04 ENCOUNTER — TELEPHONE (OUTPATIENT)
Dept: FAMILY MEDICINE | Facility: CLINIC | Age: 56
End: 2021-11-04
Payer: COMMERCIAL

## 2021-11-04 DIAGNOSIS — R20.0 NUMBNESS OF LEFT FOOT: Primary | ICD-10-CM

## 2021-11-04 RX ORDER — DICLOFENAC EPOLAMINE 0.01 G/1
1 SYSTEM TOPICAL 2 TIMES DAILY
Qty: 31 PATCH | Refills: 1 | Status: SHIPPED | OUTPATIENT
Start: 2021-11-04 | End: 2022-11-25

## 2021-11-05 RX ORDER — TRIAMTERENE/HYDROCHLOROTHIAZID 37.5-25 MG
TABLET ORAL
Qty: 90 TABLET | Refills: 2 | Status: SHIPPED | OUTPATIENT
Start: 2021-11-05 | End: 2022-11-15

## 2021-11-20 NOTE — TELEPHONE ENCOUNTER
"[facility] called re: diclofenac 1% gel not covered.     Pharmacy suggested oral ibuprofen, diclofenac, or meloxicab. Informed pt has hx GERD, asked for other alts.  They suggested: \"Diclofenac 150 ml 1.5% topical solution\"    Of note: the other topical, lidocaine-prilocaine is covered.    Please advise/order any appropriate changes and route back so triage can each out to pt.  Or could advise pt to just try the one topical first and see if this helps?    Reba Torrez RN  [facility] RN Triage Team    "
Adequate: hears normal conversation without difficulty

## 2022-06-29 ENCOUNTER — TELEPHONE (OUTPATIENT)
Dept: FAMILY MEDICINE | Facility: CLINIC | Age: 57
End: 2022-06-29

## 2022-06-29 NOTE — TELEPHONE ENCOUNTER
Reason for Call:  Same Day Appointment, Requested Provider:  Zaira Schultz    PCP: Zaira Schultz    Reason for visit: annual pe.Pts spouse would like back to back appts on the same day.    Duration of symptoms:     Have you been treated for this in the past? Yes    Additional comments:     Can we leave a detailed message on this number? YES    Phone number patient can be reached at: Home number on file [phone] (home)    Best Time:     Call taken on 6/29/2022 at 2:43 PM by Deisi Aggarwal

## 2022-09-18 ENCOUNTER — HEALTH MAINTENANCE LETTER (OUTPATIENT)
Age: 57
End: 2022-09-18

## 2022-11-15 DIAGNOSIS — K21.00 GASTROESOPHAGEAL REFLUX DISEASE WITH ESOPHAGITIS WITHOUT HEMORRHAGE: ICD-10-CM

## 2022-11-15 DIAGNOSIS — I10 ESSENTIAL HYPERTENSION: ICD-10-CM

## 2022-11-15 RX ORDER — LISINOPRIL 10 MG/1
TABLET ORAL
Qty: 90 TABLET | Refills: 3 | Status: SHIPPED | OUTPATIENT
Start: 2022-11-15 | End: 2023-11-28

## 2022-11-15 RX ORDER — TRIAMTERENE/HYDROCHLOROTHIAZID 37.5-25 MG
TABLET ORAL
Qty: 90 TABLET | Refills: 3 | Status: SHIPPED | OUTPATIENT
Start: 2022-11-15 | End: 2023-11-28

## 2022-11-24 ASSESSMENT — ENCOUNTER SYMPTOMS
COUGH: 0
NAUSEA: 0
HEADACHES: 0
DIZZINESS: 0
HEMATOCHEZIA: 0
CONSTIPATION: 0
BREAST MASS: 0
DIARRHEA: 0
FEVER: 0
HEARTBURN: 0
NERVOUS/ANXIOUS: 0
DYSURIA: 0
SORE THROAT: 0
PALPITATIONS: 0
CHILLS: 0
HEMATURIA: 0
PARESTHESIAS: 0
JOINT SWELLING: 0
MYALGIAS: 0
EYE PAIN: 0
FREQUENCY: 0
WEAKNESS: 0
SHORTNESS OF BREATH: 0
ARTHRALGIAS: 0
ABDOMINAL PAIN: 0

## 2022-11-25 ENCOUNTER — OFFICE VISIT (OUTPATIENT)
Dept: FAMILY MEDICINE | Facility: CLINIC | Age: 57
End: 2022-11-25
Payer: COMMERCIAL

## 2022-11-25 VITALS
BODY MASS INDEX: 31.65 KG/M2 | RESPIRATION RATE: 16 BRPM | WEIGHT: 190 LBS | HEART RATE: 98 BPM | DIASTOLIC BLOOD PRESSURE: 79 MMHG | HEIGHT: 65 IN | OXYGEN SATURATION: 99 % | SYSTOLIC BLOOD PRESSURE: 117 MMHG

## 2022-11-25 DIAGNOSIS — I10 ESSENTIAL HYPERTENSION: ICD-10-CM

## 2022-11-25 DIAGNOSIS — Z23 NEED FOR PROPHYLACTIC VACCINATION AND INOCULATION AGAINST INFLUENZA: ICD-10-CM

## 2022-11-25 DIAGNOSIS — E03.8 SUBCLINICAL HYPOTHYROIDISM: ICD-10-CM

## 2022-11-25 DIAGNOSIS — Z12.11 SCREEN FOR COLON CANCER: ICD-10-CM

## 2022-11-25 DIAGNOSIS — Z00.00 ROUTINE GENERAL MEDICAL EXAMINATION AT A HEALTH CARE FACILITY: Primary | ICD-10-CM

## 2022-11-25 DIAGNOSIS — K21.00 GASTROESOPHAGEAL REFLUX DISEASE WITH ESOPHAGITIS WITHOUT HEMORRHAGE: ICD-10-CM

## 2022-11-25 DIAGNOSIS — E78.5 DYSLIPIDEMIA WITH ELEVATED LOW DENSITY LIPOPROTEIN (LDL) CHOLESTEROL AND ABNORMALLY LOW HIGH DENSITY LIPOPROTEIN CHOLESTEROL: ICD-10-CM

## 2022-11-25 DIAGNOSIS — Z90.13 S/P MASTECTOMY, BILATERAL: ICD-10-CM

## 2022-11-25 DIAGNOSIS — R20.0 NUMBNESS OF LEFT FOOT: ICD-10-CM

## 2022-11-25 DIAGNOSIS — N39.0 ACUTE UTI (URINARY TRACT INFECTION): ICD-10-CM

## 2022-11-25 DIAGNOSIS — Z23 HIGH PRIORITY FOR 2019-NCOV VACCINE: ICD-10-CM

## 2022-11-25 DIAGNOSIS — Z98.890 S/P DISCECTOMY: ICD-10-CM

## 2022-11-25 LAB
ALBUMIN SERPL BCG-MCNC: 4.4 G/DL (ref 3.5–5.2)
ALP SERPL-CCNC: 80 U/L (ref 35–104)
ALT SERPL W P-5'-P-CCNC: 47 U/L (ref 10–35)
ANION GAP SERPL CALCULATED.3IONS-SCNC: 15 MMOL/L (ref 7–15)
AST SERPL W P-5'-P-CCNC: 46 U/L (ref 10–35)
BILIRUB SERPL-MCNC: 0.4 MG/DL
BUN SERPL-MCNC: 20.4 MG/DL (ref 6–20)
CALCIUM SERPL-MCNC: 9.8 MG/DL (ref 8.6–10)
CHLORIDE SERPL-SCNC: 104 MMOL/L (ref 98–107)
CHOLEST SERPL-MCNC: 207 MG/DL
CREAT SERPL-MCNC: 0.8 MG/DL (ref 0.51–0.95)
DEPRECATED HCO3 PLAS-SCNC: 23 MMOL/L (ref 22–29)
ERYTHROCYTE [DISTWIDTH] IN BLOOD BY AUTOMATED COUNT: 14.1 % (ref 10–15)
GFR SERPL CREATININE-BSD FRML MDRD: 85 ML/MIN/1.73M2
GLUCOSE SERPL-MCNC: 95 MG/DL (ref 70–99)
HCT VFR BLD AUTO: 45.9 % (ref 35–47)
HDLC SERPL-MCNC: 60 MG/DL
HGB BLD-MCNC: 14.9 G/DL (ref 11.7–15.7)
LDLC SERPL CALC-MCNC: 119 MG/DL
MCH RBC QN AUTO: 28.2 PG (ref 26.5–33)
MCHC RBC AUTO-ENTMCNC: 32.5 G/DL (ref 31.5–36.5)
MCV RBC AUTO: 87 FL (ref 78–100)
NONHDLC SERPL-MCNC: 147 MG/DL
PLATELET # BLD AUTO: 314 10E3/UL (ref 150–450)
POTASSIUM SERPL-SCNC: 4.1 MMOL/L (ref 3.4–5.3)
PROT SERPL-MCNC: 7 G/DL (ref 6.4–8.3)
RBC # BLD AUTO: 5.28 10E6/UL (ref 3.8–5.2)
SODIUM SERPL-SCNC: 142 MMOL/L (ref 136–145)
TRIGL SERPL-MCNC: 139 MG/DL
TSH SERPL DL<=0.005 MIU/L-ACNC: 3.4 UIU/ML (ref 0.3–4.2)
VIT B12 SERPL-MCNC: 633 PG/ML (ref 232–1245)
WBC # BLD AUTO: 7.8 10E3/UL (ref 4–11)

## 2022-11-25 PROCEDURE — 99396 PREV VISIT EST AGE 40-64: CPT | Mod: 25 | Performed by: INTERNAL MEDICINE

## 2022-11-25 PROCEDURE — 90682 RIV4 VACC RECOMBINANT DNA IM: CPT | Performed by: INTERNAL MEDICINE

## 2022-11-25 PROCEDURE — 82607 VITAMIN B-12: CPT | Performed by: INTERNAL MEDICINE

## 2022-11-25 PROCEDURE — 82306 VITAMIN D 25 HYDROXY: CPT | Performed by: INTERNAL MEDICINE

## 2022-11-25 PROCEDURE — 0124A COVID-19 VACCINE BIVALENT BOOSTER 12+ (PFIZER): CPT | Performed by: INTERNAL MEDICINE

## 2022-11-25 PROCEDURE — 91312 COVID-19 VACCINE BIVALENT BOOSTER 12+ (PFIZER): CPT | Performed by: INTERNAL MEDICINE

## 2022-11-25 PROCEDURE — 85027 COMPLETE CBC AUTOMATED: CPT | Performed by: INTERNAL MEDICINE

## 2022-11-25 PROCEDURE — 80061 LIPID PANEL: CPT | Performed by: INTERNAL MEDICINE

## 2022-11-25 PROCEDURE — 84443 ASSAY THYROID STIM HORMONE: CPT | Performed by: INTERNAL MEDICINE

## 2022-11-25 PROCEDURE — 90471 IMMUNIZATION ADMIN: CPT | Performed by: INTERNAL MEDICINE

## 2022-11-25 PROCEDURE — 36415 COLL VENOUS BLD VENIPUNCTURE: CPT | Performed by: INTERNAL MEDICINE

## 2022-11-25 PROCEDURE — 80053 COMPREHEN METABOLIC PANEL: CPT | Performed by: INTERNAL MEDICINE

## 2022-11-25 PROCEDURE — 99214 OFFICE O/P EST MOD 30 MIN: CPT | Mod: 25 | Performed by: INTERNAL MEDICINE

## 2022-11-25 RX ORDER — NITROFURANTOIN 25; 75 MG/1; MG/1
100 CAPSULE ORAL 2 TIMES DAILY
Qty: 14 CAPSULE | Refills: 0 | Status: SHIPPED | OUTPATIENT
Start: 2022-11-25 | End: 2023-11-28

## 2022-11-25 RX ORDER — DICLOFENAC EPOLAMINE 0.01 G/1
1 SYSTEM TOPICAL 2 TIMES DAILY
Qty: 31 PATCH | Refills: 1 | Status: SHIPPED | OUTPATIENT
Start: 2022-11-25 | End: 2023-11-28

## 2022-11-25 RX ORDER — CYCLOBENZAPRINE HCL 10 MG
TABLET ORAL
Qty: 270 TABLET | Refills: 3 | Status: SHIPPED | OUTPATIENT
Start: 2022-11-25

## 2022-11-25 RX ORDER — LIDOCAINE/PRILOCAINE 2.5 %-2.5%
CREAM (GRAM) TOPICAL DAILY
Qty: 30 G | Refills: 4 | Status: SHIPPED | OUTPATIENT
Start: 2022-11-25

## 2022-11-25 ASSESSMENT — PAIN SCALES - GENERAL: PAINLEVEL: MODERATE PAIN (5)

## 2022-11-25 NOTE — PROGRESS NOTES
SUBJECTIVE:   CC: Bertha is an 57 year old who presents for preventive health visit.   This very pleasant 57 years old had left-sided breast cancer which was H ER/ salty-2 to positive  She is s/p bilateral mastectomy and left side radiation as well and remains in remission fortunately  She has not had surgery for her back and has slight left-sided foot drop  She is doing well otherwise and her heartburn is stable  Her left foot remains numb and she has persistent radicular symptoms  Her GERD is stable  Exercise and diet is good and she lost 11    Healthy Habits:     Getting at least 3 servings of Calcium per day:  Yes    Bi-annual eye exam:  Yes    Dental care twice a year:  Yes    Sleep apnea or symptoms of sleep apnea:  None    Diet:  Regular (no restrictions)    Frequency of exercise:  4-5 days/week    Duration of exercise:  30-45 minutes    Taking medications regularly:  Yes    Medication side effects:  None    PHQ-2 Total Score: 0            Today's PHQ-2 Score:   PHQ-2 ( 1999 Pfizer) 11/24/2022   Q1: Little interest or pleasure in doing things 0   Q2: Feeling down, depressed or hopeless 0   PHQ-2 Score 0   PHQ-2 Total Score (12-17 Years)- Positive if 3 or more points; Administer PHQ-A if positive -   Q1: Little interest or pleasure in doing things Not at all   Q2: Feeling down, depressed or hopeless Not at all   PHQ-2 Score 0           Social History     Tobacco Use     Smoking status: Never     Smokeless tobacco: Never   Substance Use Topics     Alcohol use: Yes     Comment: rare maybe 1-2 drinks a YEAR      If you drink alcohol do you typically have >3 drinks per day or >7 drinks per week? No    No flowsheet data found.    Reviewed orders with patient.  Reviewed health maintenance and updated orders accordingly - y  BP Readings from Last 3 Encounters:   11/25/22 117/79   10/29/21 124/82   10/19/20 130/79    Wt Readings from Last 3 Encounters:   11/25/22 86.2 kg (190 lb)   10/29/21 86.6 kg (191 lb)   10/19/20  82.6 kg (182 lb 3.2 oz)                  Patient Active Problem List   Diagnosis     S/P chemotherapy, time since greater than 12 weeks     S/P hemilaminotomy     S/P silicone breast implant     S/P mastectomy, bilateral     Family history of ovarian cancer     Dyslipidemia with elevated low density lipoprotein (LDL) cholesterol and abnormally low high density lipoprotein cholesterol     Subclinical hypothyroidism     Past Surgical History:   Procedure Laterality Date     AS LIGATE FALLOPIAN TUBE       HC REVISE BREAST RECONSTRUCTION      2015.2016     HYSTEROSCOPY      2014     TONSILLECTOMY       ZZC ANTERIOR LUMBAR DISCECTOMY      2001       Social History     Tobacco Use     Smoking status: Never     Smokeless tobacco: Never   Substance Use Topics     Alcohol use: Yes     Comment: rare maybe 1-2 drinks a YEAR      Family History   Problem Relation Age of Onset     Ovarian Cancer Mother      Bipolar Disorder Sister      Diabetes Sister      Alcoholism Brother      Diabetic Kidney Disease Maternal Grandmother      Hypertension Maternal Grandmother      Sleep Apnea Son      Hypertension Son          Current Outpatient Medications   Medication Sig Dispense Refill     amoxicillin (AMOXIL) 500 MG capsule Take 4 capsules (2,000 mg) by mouth once for 1 dose Before dental procedures 4 capsule 0     cyclobenzaprine (FLEXERIL) 10 MG tablet TAKE 1 TABLET THREE TIMES A DAY AS NEEDED FOR MUSCLE SPASMS 270 tablet 3     diclofenac (FLECTOR) 1.3 % patch Externally apply 1 patch topically 2 times daily 31 patch 1     diclofenac (VOLTAREN) 1 % topical gel Apply 4 g topically 4 times daily 100 g 3     lidocaine-prilocaine (EMLA) 2.5-2.5 % external cream Apply topically daily profile 30 g 4     lisinopril (ZESTRIL) 10 MG tablet TAKE 1 TABLET AT BEDTIME 90 tablet 3     loratadine (CLARITIN) 10 MG tablet Take 1 tablet by mouth daily       nitroFURantoin macrocrystal-monohydrate (MACROBID) 100 MG capsule Take 1 capsule (100 mg) by  mouth 2 times daily 14 capsule 0     omeprazole (PRILOSEC) 20 MG DR capsule TAKE 1 CAPSULE TWICE A  capsule 3     triamcinolone (ARISTOCORT HP) 0.5 % external cream APPLY TOPICALLY AS NEEDED 30 g 11     triamterene-HCTZ (MAXZIDE-25) 37.5-25 MG tablet TAKE 1 TABLET DAILY 90 tablet 3     vitamin D3 (CHOLECALCIFEROL) 44301 units capsule Take 1 capsule (50,000 Units) by mouth once a week 12 capsule 0     Allergies   Allergen Reactions     No Known Allergies        Breast Cancer Screening:  Any new diagnosis of family breast, ovarian, or bowel cancer? No    FHS-7:   Breast CA Risk Assessment (FHS-7) 10/25/2021   Did any of your relatives have bilateral breast cancer? No   Did any man in your family have breast cancer? No   Did any woman in your family have breast cancer before age 50 y? No   Do you have 2 or more relatives with breast and/or bowel cancer? No       Mammogram Screening - Mammography discussed, not appropriate due to mrm  Pertinent mammograms are reviewed under the imaging tab.    History of abnormal Pap smear: NO - age 30- 65 PAP every 3 years recommended  PAP / HPV Latest Ref Rng & Units 10/19/2020   PAP (Historical) - NIL   HPV16 NEG:Negative Negative   HPV18 NEG:Negative Negative   HRHPV NEG:Negative Negative     Reviewed and updated as needed this visit by clinical staff   Tobacco  Allergies  Meds  Problems  Med Hx   Fam Hx          Reviewed and updated as needed this visit by Provider      Problems  Med Hx   Fam Hx         Past Medical History:   Diagnosis Date     Breast cancer metastasized to axillary lymph node, left (H)     2014     Dyslipidemia with elevated low density lipoprotein (LDL) cholesterol and abnormally low high density lipoprotein cholesterol      Family history of ovarian cancer      Prediabetes      S/P chemotherapy, time since greater than 12 weeks      S/P hemilaminotomy      S/P mastectomy, bilateral      S/P silicone breast implant       Past Surgical History:  "  Procedure Laterality Date     AS LIGATE FALLOPIAN TUBE       HC REVISE BREAST RECONSTRUCTION      2015.2016     HYSTEROSCOPY      2014     TONSILLECTOMY       ZZC ANTERIOR LUMBAR DISCECTOMY      2001       Review of Systems  10 point ROS of systems including Constitutional, Eyes, Respiratory, Cardiovascular, Gastroenterology, Genitourinary, Integumentary, Muscularskeletal, Psychiatric were all negative except for pertinent positives noted in my HPI.       Current Outpatient Medications   Medication     amoxicillin (AMOXIL) 500 MG capsule     cyclobenzaprine (FLEXERIL) 10 MG tablet     diclofenac (FLECTOR) 1.3 % patch     diclofenac (VOLTAREN) 1 % topical gel     lidocaine-prilocaine (EMLA) 2.5-2.5 % external cream     lisinopril (ZESTRIL) 10 MG tablet     loratadine (CLARITIN) 10 MG tablet     nitroFURantoin macrocrystal-monohydrate (MACROBID) 100 MG capsule     omeprazole (PRILOSEC) 20 MG DR capsule     triamcinolone (ARISTOCORT HP) 0.5 % external cream     triamterene-HCTZ (MAXZIDE-25) 37.5-25 MG tablet     vitamin D3 (CHOLECALCIFEROL) 60115 units capsule     No current facility-administered medications for this visit.       OBJECTIVE:   /79 (BP Location: Right arm, Patient Position: Sitting, Cuff Size: Adult Large)   Pulse 98   Resp 16   Ht 1.651 m (5' 5\")   Wt 86.2 kg (190 lb)   SpO2 99%   BMI 31.62 kg/m    Physical Exam  GENERAL APPEARANCE: healthy, alert and no distress  EYES: Eyes grossly normal to inspection, PERRL and conjunctivae and sclerae normal  HENT: ear canals and TM's normal, nose and mouth without ulcers or lesions, oropharynx clear and oral mucous membranes moist  NECK: no adenopathy, no asymmetry, masses, or scars and thyroid normal to palpation  RESP: lungs clear to auscultation - no rales, rhonchi or wheezes  BREAST: Bilateral mastectomy with implants normal without masses, tenderness delroy palpable axillary masses or adenopathy  Radiation marker noted  CV: regular rate and rhythm, " normal S1 S2, no S3 or S4, no murmur, click or rub, no peripheral edema and peripheral pulses strong  ABDOMEN: soft, nontender, no hepatosplenomegaly, no masses and bowel sounds normal  MS: Surgical scar on the back no musculoskeletal defects are noted and gait is age appropriate without ataxia  SKIN: Skin changes of the foot no suspicious lesions or rashes  NEURO: Normal strength and tone, sensory exam grossly normal, mentation intact and speech normal  PSYCH: mentation appears normal and affect normal/bright        ASSESSMENT/PLAN:   Bertha was seen today for physical, imm/inj and imm/inj.    Diagnoses and all orders for this visit:    Routine general medical examination at a health care facility  Very pleasant 57 years old who is up-to-date on immunizations and we discussed diet  Her AHA calculated risk was 3.2%  S/P mastectomy, bilateral  We will continue to watch for any relapse locally or otherwise  Dyslipidemia with elevated low density lipoprotein (LDL) cholesterol and abnormally low high density lipoprotein cholesterol  Excellent HDL value  -     CBC with Platelets  ; Future  -     Comprehensive metabolic panel; Future  -     Lipid panel reflex to direct LDL Fasting; Future  -     CBC with Platelets    -     Comprehensive metabolic panel  -     Lipid panel reflex to direct LDL Fasting    Essential hypertension on lisinopril and Dyazide  Excellent blood pressure readings and we will continue with the same medications  -     Comprehensive metabolic panel; Future  -     Comprehensive metabolic panel    Subclinical hypothyroidism  -     REVIEW OF HEALTH MAINTENANCE PROTOCOL ORDERS  -     TSH with free T4 reflex; Future  -     TSH with free T4 reflex    Gastroesophageal reflux disease with esophagitis without hemorrhage  She is taking Prilosec twice a day but trying to cut down  S/P discectomy  -     cyclobenzaprine (FLEXERIL) 10 MG tablet; TAKE 1 TABLET THREE TIMES A DAY AS NEEDED FOR MUSCLE SPASMS    Acute UTI  (urinary tract infection)  -     nitroFURantoin macrocrystal-monohydrate (MACROBID) 100 MG capsule; Take 1 capsule (100 mg) by mouth 2 times daily  As needed  Numbness of left foot  -     lidocaine-prilocaine (EMLA) 2.5-2.5 % external cream; Apply topically daily profile  -     diclofenac (FLECTOR) 1.3 % patch; Externally apply 1 patch topically 2 times daily  -     Vitamin B12; Future  -     Vitamin D Deficiency; Future  -     Vitamin B12  -     Vitamin D Deficiency  She does her exercises also  Need for prophylactic vaccination and inoculation against influenza  -     INFLUENZA QUAD, RECOMBINANT, P-FREE (RIV4) (FLUBLOK)    Screen for colon cancer  -     COLOSKAR(EXACT SCIENCES); Future    High priority for 2019-nCoV vaccine  -     COVID-19,PF,PFIZER BOOSTER BIVALENT 12+Yrs              COUNSELING:  COVID booster and flu shot        She reports that she has never smoked. She has never used smokeless tobacco.      Zaira Schultz MD  [facility]

## 2022-11-27 LAB — DEPRECATED CALCIDIOL+CALCIFEROL SERPL-MC: 45 UG/L (ref 20–75)

## 2023-03-29 LAB — NONINV COLON CA DNA+OCC BLD SCRN STL QL: NEGATIVE

## 2023-07-01 ENCOUNTER — MYC MEDICAL ADVICE (OUTPATIENT)
Dept: FAMILY MEDICINE | Facility: CLINIC | Age: 58
End: 2023-07-01
Payer: COMMERCIAL

## 2023-07-01 DIAGNOSIS — R21 RASH: ICD-10-CM

## 2023-07-03 RX ORDER — TRIAMCINOLONE ACETONIDE 5 MG/G
CREAM TOPICAL PRN
Qty: 30 G | Refills: 11 | Status: SHIPPED | OUTPATIENT
Start: 2023-07-03 | End: 2024-06-04

## 2023-11-24 ASSESSMENT — ENCOUNTER SYMPTOMS
CONSTIPATION: 0
DYSURIA: 0
JOINT SWELLING: 0
HEMATOCHEZIA: 0
CHILLS: 0
HEMATURIA: 0
NERVOUS/ANXIOUS: 0
COUGH: 0
NAUSEA: 0
MYALGIAS: 1
PARESTHESIAS: 0
HEADACHES: 0
DIZZINESS: 0
PALPITATIONS: 0
ARTHRALGIAS: 1
FEVER: 0
DIARRHEA: 0
BREAST MASS: 0
ABDOMINAL PAIN: 0
SORE THROAT: 0
WEAKNESS: 0
EYE PAIN: 0
HEARTBURN: 0
FREQUENCY: 0
SHORTNESS OF BREATH: 0

## 2023-12-01 ENCOUNTER — OFFICE VISIT (OUTPATIENT)
Dept: FAMILY MEDICINE | Facility: CLINIC | Age: 58
End: 2023-12-01
Payer: COMMERCIAL

## 2023-12-01 ENCOUNTER — APPOINTMENT (OUTPATIENT)
Dept: LAB | Facility: CLINIC | Age: 58
End: 2023-12-01
Payer: COMMERCIAL

## 2023-12-01 VITALS
DIASTOLIC BLOOD PRESSURE: 84 MMHG | HEIGHT: 65 IN | OXYGEN SATURATION: 97 % | WEIGHT: 192 LBS | SYSTOLIC BLOOD PRESSURE: 130 MMHG | HEART RATE: 96 BPM | BODY MASS INDEX: 31.99 KG/M2 | RESPIRATION RATE: 18 BRPM | TEMPERATURE: 96.9 F

## 2023-12-01 DIAGNOSIS — Z12.11 SPECIAL SCREENING FOR MALIGNANT NEOPLASMS, COLON: ICD-10-CM

## 2023-12-01 DIAGNOSIS — Z98.890 S/P DISCECTOMY: ICD-10-CM

## 2023-12-01 DIAGNOSIS — E78.5 DYSLIPIDEMIA WITH ELEVATED LOW DENSITY LIPOPROTEIN (LDL) CHOLESTEROL AND ABNORMALLY LOW HIGH DENSITY LIPOPROTEIN CHOLESTEROL: ICD-10-CM

## 2023-12-01 DIAGNOSIS — R74.8 ELEVATED LIVER ENZYMES: ICD-10-CM

## 2023-12-01 DIAGNOSIS — K21.00 GASTROESOPHAGEAL REFLUX DISEASE WITH ESOPHAGITIS WITHOUT HEMORRHAGE: ICD-10-CM

## 2023-12-01 DIAGNOSIS — Z00.00 ROUTINE GENERAL MEDICAL EXAMINATION AT A HEALTH CARE FACILITY: Primary | ICD-10-CM

## 2023-12-01 DIAGNOSIS — E03.8 SUBCLINICAL HYPOTHYROIDISM: ICD-10-CM

## 2023-12-01 DIAGNOSIS — N39.0 ACUTE UTI (URINARY TRACT INFECTION): ICD-10-CM

## 2023-12-01 DIAGNOSIS — Z80.41 FAMILY HISTORY OF OVARIAN CANCER: ICD-10-CM

## 2023-12-01 DIAGNOSIS — R23.9 SKIN CHANGE: ICD-10-CM

## 2023-12-01 DIAGNOSIS — I10 ESSENTIAL HYPERTENSION: ICD-10-CM

## 2023-12-01 DIAGNOSIS — Z90.13 S/P MASTECTOMY, BILATERAL: ICD-10-CM

## 2023-12-01 DIAGNOSIS — R73.03 PREDIABETES: ICD-10-CM

## 2023-12-01 DIAGNOSIS — E83.52 HYPERCALCEMIA: ICD-10-CM

## 2023-12-01 LAB
ALBUMIN SERPL BCG-MCNC: 4.7 G/DL (ref 3.5–5.2)
ALP SERPL-CCNC: 84 U/L (ref 40–150)
ALT SERPL W P-5'-P-CCNC: 68 U/L (ref 0–50)
ANION GAP SERPL CALCULATED.3IONS-SCNC: 14 MMOL/L (ref 7–15)
AST SERPL W P-5'-P-CCNC: 64 U/L (ref 0–45)
BILIRUB SERPL-MCNC: 0.5 MG/DL
BUN SERPL-MCNC: 19 MG/DL (ref 6–20)
CALCIUM SERPL-MCNC: 10.1 MG/DL (ref 8.6–10)
CHLORIDE SERPL-SCNC: 103 MMOL/L (ref 98–107)
CHOLEST SERPL-MCNC: 222 MG/DL
CREAT SERPL-MCNC: 0.85 MG/DL (ref 0.51–0.95)
DEPRECATED HCO3 PLAS-SCNC: 26 MMOL/L (ref 22–29)
EGFRCR SERPLBLD CKD-EPI 2021: 79 ML/MIN/1.73M2
ERYTHROCYTE [DISTWIDTH] IN BLOOD BY AUTOMATED COUNT: 14.3 % (ref 10–15)
GLUCOSE SERPL-MCNC: 102 MG/DL (ref 70–99)
HBA1C MFR BLD: 5.7 % (ref 0–5.6)
HCT VFR BLD AUTO: 45.5 % (ref 35–47)
HDLC SERPL-MCNC: 62 MG/DL
HGB BLD-MCNC: 15.1 G/DL (ref 11.7–15.7)
LDLC SERPL CALC-MCNC: 134 MG/DL
MCH RBC QN AUTO: 28.5 PG (ref 26.5–33)
MCHC RBC AUTO-ENTMCNC: 33.2 G/DL (ref 31.5–36.5)
MCV RBC AUTO: 86 FL (ref 78–100)
NONHDLC SERPL-MCNC: 160 MG/DL
PLATELET # BLD AUTO: 334 10E3/UL (ref 150–450)
POTASSIUM SERPL-SCNC: 3.8 MMOL/L (ref 3.4–5.3)
PROT SERPL-MCNC: 7.8 G/DL (ref 6.4–8.3)
RBC # BLD AUTO: 5.3 10E6/UL (ref 3.8–5.2)
SODIUM SERPL-SCNC: 143 MMOL/L (ref 135–145)
T4 FREE SERPL-MCNC: 1.11 NG/DL (ref 0.9–1.7)
TRIGL SERPL-MCNC: 132 MG/DL
TSH SERPL DL<=0.005 MIU/L-ACNC: 4.52 UIU/ML (ref 0.3–4.2)
VIT B12 SERPL-MCNC: 1039 PG/ML (ref 232–1245)
VIT D+METAB SERPL-MCNC: 45 NG/ML (ref 20–50)
WBC # BLD AUTO: 7.5 10E3/UL (ref 4–11)

## 2023-12-01 PROCEDURE — 85027 COMPLETE CBC AUTOMATED: CPT | Performed by: INTERNAL MEDICINE

## 2023-12-01 PROCEDURE — 84439 ASSAY OF FREE THYROXINE: CPT | Performed by: INTERNAL MEDICINE

## 2023-12-01 PROCEDURE — 82306 VITAMIN D 25 HYDROXY: CPT | Performed by: INTERNAL MEDICINE

## 2023-12-01 PROCEDURE — 87624 HPV HI-RISK TYP POOLED RSLT: CPT | Performed by: INTERNAL MEDICINE

## 2023-12-01 PROCEDURE — 83036 HEMOGLOBIN GLYCOSYLATED A1C: CPT | Performed by: INTERNAL MEDICINE

## 2023-12-01 PROCEDURE — 90686 IIV4 VACC NO PRSV 0.5 ML IM: CPT | Performed by: INTERNAL MEDICINE

## 2023-12-01 PROCEDURE — 36415 COLL VENOUS BLD VENIPUNCTURE: CPT | Performed by: INTERNAL MEDICINE

## 2023-12-01 PROCEDURE — 82607 VITAMIN B-12: CPT | Performed by: INTERNAL MEDICINE

## 2023-12-01 PROCEDURE — 80061 LIPID PANEL: CPT | Performed by: INTERNAL MEDICINE

## 2023-12-01 PROCEDURE — 84443 ASSAY THYROID STIM HORMONE: CPT | Performed by: INTERNAL MEDICINE

## 2023-12-01 PROCEDURE — 90471 IMMUNIZATION ADMIN: CPT | Performed by: INTERNAL MEDICINE

## 2023-12-01 PROCEDURE — 99396 PREV VISIT EST AGE 40-64: CPT | Mod: 25 | Performed by: INTERNAL MEDICINE

## 2023-12-01 PROCEDURE — 80053 COMPREHEN METABOLIC PANEL: CPT | Performed by: INTERNAL MEDICINE

## 2023-12-01 PROCEDURE — 99214 OFFICE O/P EST MOD 30 MIN: CPT | Mod: 25 | Performed by: INTERNAL MEDICINE

## 2023-12-01 PROCEDURE — G0145 SCR C/V CYTO,THINLAYER,RESCR: HCPCS | Performed by: INTERNAL MEDICINE

## 2023-12-01 RX ORDER — TRIAMTERENE/HYDROCHLOROTHIAZID 37.5-25 MG
1 TABLET ORAL DAILY
Qty: 90 TABLET | Refills: 3 | Status: SHIPPED | OUTPATIENT
Start: 2023-12-01 | End: 2024-06-25

## 2023-12-01 RX ORDER — NITROFURANTOIN 25; 75 MG/1; MG/1
100 CAPSULE ORAL 2 TIMES DAILY
Qty: 14 CAPSULE | Refills: 1 | Status: SHIPPED | OUTPATIENT
Start: 2023-12-01

## 2023-12-01 RX ORDER — TRIAMTERENE/HYDROCHLOROTHIAZID 37.5-25 MG
1 TABLET ORAL DAILY
Qty: 90 TABLET | Refills: 3 | Status: SHIPPED | OUTPATIENT
Start: 2023-12-01 | End: 2024-07-02

## 2023-12-01 RX ORDER — LISINOPRIL 10 MG/1
10 TABLET ORAL AT BEDTIME
Qty: 90 TABLET | Refills: 3 | Status: SHIPPED | OUTPATIENT
Start: 2023-12-01

## 2023-12-01 ASSESSMENT — ENCOUNTER SYMPTOMS
ARTHRALGIAS: 1
HEMATURIA: 0
HEARTBURN: 0
DIZZINESS: 0
BREAST MASS: 0
ABDOMINAL PAIN: 0
SORE THROAT: 0
HEADACHES: 0
FREQUENCY: 0
PALPITATIONS: 0
FEVER: 0
JOINT SWELLING: 0
DIARRHEA: 0
SHORTNESS OF BREATH: 0
EYE PAIN: 0
CONSTIPATION: 0
PARESTHESIAS: 0
COUGH: 0
DYSURIA: 0
HEMATOCHEZIA: 0
MYALGIAS: 1
CHILLS: 0
NERVOUS/ANXIOUS: 0
NAUSEA: 0
WEAKNESS: 0

## 2023-12-01 ASSESSMENT — PAIN SCALES - GENERAL: PAINLEVEL: NO PAIN (0)

## 2023-12-01 NOTE — PROGRESS NOTES
SUBJECTIVE:   Bertha is a 58 year old, presenting for the following:  Physical (Fasting)    This is a very pleasant 58 years old woman who had breast cancer and history of back pain with left-sided foot drop  She is gaining weight now  Her GERD is stable and blood pressure is stable  She does not have much fatigue  She exercises a lot  Mammogram remains in remission  This was on the left side by mammogram   which was for screening and she had suspicious lymph nodes  She underwent biopsy  5/5/2014 Biopsy/Pathology   Patient underwent biopsy of the left axillary lymph node and the left breast lesion. The biopsy was consistent with ductal carcinoma in both. The lesion was grade III (of III), ER/AZ negative, HER-2 positive with a score of 3+ on immunohistochemistry. Ki-67 was 58.8.       5/8/2014 Other   PET scan showed FDG-avid activity in the left breast and left axilla. There were no other suspicious lesions.        5/30/2014 - Biological/Targeted/Hormone Therapy   Patient initiated her neoadjuvant therapy on the randomized arm with pertuzumab plus T-DM1.       8/29/2014 Biopsy/Pathology   Patient's right-sided lymph node biopsy was negative for any malignancy. This was performed after MRI of the breast showed suspicious lymph nodes in the right axilla. The same MRI showed excellent response of her primary tumor and left-sided adenopathy.        9/3/2014 - 10/15/2014 Chemotherapy   Patient started with cycle number one of AC.   Healthy Habits:     Getting at least 3 servings of Calcium per day:  NO    Bi-annual eye exam:  Yes    Dental care twice a year:  Yes    Sleep apnea or symptoms of sleep apnea:  None    Diet:  Regular (no restrictions)    Frequency of exercise:  4-5 days/week    Duration of exercise:  30-45 minutes    Taking medications regularly:  Yes    Barriers to taking medications:  None    Medication side effects:  None    Additional concerns today:  No                      Social History     Tobacco  Use    Smoking status: Never    Smokeless tobacco: Never   Substance Use Topics    Alcohol use: Yes     Comment: rare maybe 1-2 drinks a YEAR              11/24/2023     9:21 AM   Alcohol Use   Prescreen: >3 drinks/day or >7 drinks/week? No       Reviewed orders with patient.  Reviewed health maintenance and updated orders accordingly - Yes  BP Readings from Last 3 Encounters:   12/01/23 130/84   11/25/22 117/79   10/29/21 124/82    Wt Readings from Last 3 Encounters:   12/01/23 87.1 kg (192 lb)   11/25/22 86.2 kg (190 lb)   10/29/21 86.6 kg (191 lb)                  Patient Active Problem List   Diagnosis    S/P chemotherapy, time since greater than 12 weeks    S/P hemilaminotomy    S/P silicone breast implant    S/P mastectomy, bilateral    Family history of ovarian cancer    Dyslipidemia with elevated low density lipoprotein (LDL) cholesterol and abnormally low high density lipoprotein cholesterol    Subclinical hypothyroidism     Past Surgical History:   Procedure Laterality Date    AS LIGATE FALLOPIAN TUBE      BACK SURGERY      HC REVISE BREAST RECONSTRUCTION      2015.2016    HYSTEROSCOPY      2014    TONSILLECTOMY      ZZC ANTERIOR LUMBAR DISCECTOMY      2001       Social History     Tobacco Use    Smoking status: Never    Smokeless tobacco: Never   Substance Use Topics    Alcohol use: Yes     Comment: rare maybe 1-2 drinks a YEAR      Family History   Problem Relation Age of Onset    Ovarian Cancer Mother     Bipolar Disorder Sister     Diabetes Sister     Mental Illness Sister     Alcoholism Brother     Diabetic Kidney Disease Maternal Grandmother     Diabetes Maternal Grandmother     Hyperlipidemia Maternal Grandmother     Cerebrovascular Disease Maternal Grandmother     Osteoporosis Maternal Grandmother     Sleep Apnea Son     Prostate Cancer Maternal Grandfather     Hypertension No family hx of          Current Outpatient Medications   Medication Sig Dispense Refill    amoxicillin (AMOXIL) 500 MG  capsule Take 4 capsules (2,000 mg) by mouth once for 1 dose Before dental procedures 4 capsule 0    cyclobenzaprine (FLEXERIL) 10 MG tablet TAKE 1 TABLET THREE TIMES A DAY AS NEEDED FOR MUSCLE SPASMS 270 tablet 3    lidocaine-prilocaine (EMLA) 2.5-2.5 % external cream Apply topically daily profile 30 g 4    lisinopril (ZESTRIL) 10 MG tablet Take 1 tablet (10 mg) by mouth at bedtime 90 tablet 3    loratadine (CLARITIN) 10 MG tablet Take 1 tablet by mouth daily      nitroFURantoin macrocrystal-monohydrate (MACROBID) 100 MG capsule Take 1 capsule (100 mg) by mouth 2 times daily 14 capsule 1    omeprazole (PRILOSEC) 20 MG DR capsule Take 1 capsule (20 mg) by mouth 2 times daily 180 capsule 3    Semaglutide-Weight Management (WEGOVY) 0.25 MG/0.5ML pen Inject 0.25 mg Subcutaneous once a week 2 mL 0    triamcinolone (ARISTOCORT HP) 0.5 % external cream Apply topically as needed 30 g 11    triamterene-HCTZ (MAXZIDE-25) 37.5-25 MG tablet Take 1 tablet by mouth daily 90 tablet 3    triamterene-HCTZ (MAXZIDE-25) 37.5-25 MG tablet Take 1 tablet by mouth daily 90 tablet 3    vitamin D3 (CHOLECALCIFEROL) 06035 units capsule Take 1 capsule (50,000 Units) by mouth once a week 12 capsule 0     Allergies   Allergen Reactions    No Known Allergies        Breast Cancer Screening:    FHS-7:       10/25/2021     3:10 PM 11/24/2023     9:23 AM   Breast CA Risk Assessment (FHS-7)   Did any of your first-degree relatives have breast or ovarian cancer?  Yes   Did any of your relatives have bilateral breast cancer? No No   Did any man in your family have breast cancer? No No   Did any woman in your family have breast cancer before age 50 y? No No   Do you have 2 or more relatives with breast and/or ovarian cancer?  No   Do you have 2 or more relatives with breast and/or bowel cancer? No No         Pertinent mammograms are reviewed under the imaging tab.    History of abnormal Pap smear: NO - age 30- 65 PAP every 3 years recommended       Latest Ref Rng & Units 10/19/2020    11:44 AM 10/19/2020    11:11 AM 5/13/2015    12:00 AM   PAP / HPV   PAP (Historical)   NIL     HPV 16 DNA NEG^Negative Negative      HPV 18 DNA NEG^Negative Negative      Other HR HPV NEG^Negative Negative      PAP-ABSTRACT    See Scanned Document           This result is from an external source.     Reviewed and updated as needed this visit by clinical staff   Tobacco  Allergies  Meds      Soc Hx        Reviewed and updated as needed this visit by Provider     Meds             Past Medical History:   Diagnosis Date    Breast cancer metastasized to axillary lymph node, left (H)     2014    Dyslipidemia with elevated low density lipoprotein (LDL) cholesterol and abnormally low high density lipoprotein cholesterol     Family history of ovarian cancer     Hypertension     Prediabetes     S/P chemotherapy, time since greater than 12 weeks     S/P hemilaminotomy     S/P mastectomy, bilateral     S/P silicone breast implant       Past Surgical History:   Procedure Laterality Date    AS LIGATE FALLOPIAN TUBE      BACK SURGERY      HC REVISE BREAST RECONSTRUCTION      2015.2016    HYSTEROSCOPY      2014    TONSILLECTOMY      Z ANTERIOR LUMBAR DISCECTOMY      2001       Review of Systems   Constitutional:  Negative for chills and fever.   HENT:  Negative for congestion, ear pain, hearing loss and sore throat.    Eyes:  Negative for pain and visual disturbance.   Respiratory:  Negative for cough and shortness of breath.    Cardiovascular:  Negative for chest pain, palpitations and peripheral edema.   Gastrointestinal:  Negative for abdominal pain, constipation, diarrhea, heartburn, hematochezia and nausea.   Breasts:  Negative for tenderness, breast mass and discharge.   Genitourinary:  Negative for dysuria, frequency, genital sores, hematuria, pelvic pain, urgency, vaginal bleeding and vaginal discharge.   Musculoskeletal:  Positive for arthralgias and myalgias. Negative for joint  "swelling.   Skin:  Negative for rash.   Neurological:  Negative for dizziness, weakness, headaches and paresthesias.   Psychiatric/Behavioral:  Negative for mood changes. The patient is not nervous/anxious.           OBJECTIVE:   /84 (BP Location: Right arm, Patient Position: Sitting, Cuff Size: Adult Regular)   Pulse 96   Temp 96.9  F (36.1  C) (Temporal)   Resp 18   Ht 1.654 m (5' 5.1\")   Wt 87.1 kg (192 lb)   SpO2 97%   BMI 31.85 kg/m    Physical Exam  GENERAL APPEARANCE: healthy, alert and no distress  EYES: Eyes grossly normal to inspection, PERRL and conjunctivae and sclerae normal  HENT: ear canals and TM's normal, nose and mouth without ulcers or lesions, oropharynx clear and oral mucous membranes moist  NECK: no adenopathy, no asymmetry, masses, or scars and thyroid normal to palpation  RESP: lungs clear to auscultation - no rales, rhonchi or wheezes  BREAST: No lymphadenopathy with bilateral implants in place and radiation marker  CV: regular rate and rhythm, normal S1 S2, no S3 or S4, no murmur, click or rub, no peripheral edema and peripheral pulses strong  ABDOMEN: soft, nontender, no hepatosplenomegaly, no masses and bowel sounds normal  MS: no musculoskeletal defects are noted and gait is age appropriate without ataxia  SKIN: Multiple benign lesions no suspicious lesions or rashes  NEURO: Left-sided foot drop normal strength and tone, sensory exam grossly normal, mentation intact and speech normal  PSYCH: mentation appears normal and affect normal/bright        ASSESSMENT/PLAN:   Bertha was seen today for physical.    Diagnoses and all orders for this visit:    Routine general medical examination at a health care facility  -     Pap screen with HPV - recommended age 30 - 65 years  This very pleasant 58 years old woman has history of breast cancer but is in remission  S/P mastectomy, bilateral  -     Vitamin D Deficiency  This was by mammogram in 2014 5/5/2014 Biopsy/Pathology   Patient " underwent biopsy of the left axillary lymph node and the left breast lesion. The biopsy was consistent with ductal carcinoma in both. The lesion was grade III (of III), ER/WA negative, HER-2 positive with a score of 3+ on immunohistochemistry. Ki-67 was 58.8.       5/8/2014 Other   PET scan showed FDG-avid activity in the left breast and left axilla. There were no other suspicious lesions.        5/30/2014 - Biological/Targeted/Hormone Therapy   Patient initiated her neoadjuvant therapy on the randomized arm with pertuzumab plus T-DM1.       8/29/2014 Biopsy/Pathology   Patient's right-sided lymph node biopsy was negative for any malignancy. This was performed after MRI of the breast showed suspicious lymph nodes in the right axilla. The same MRI showed excellent response of her primary tumor and left-sided adenopathy.        9/3/2014 - 10/15/2014 Chemotherapy   Patient started with cycle number one of AC.   Dyslipidemia with elevated low density lipoprotein (LDL) cholesterol and abnormally low high density lipoprotein cholesterol    -     Comprehensive metabolic panel  -     Lipid panel reflex to direct LDL Fasting  -     Vitamin D Deficiency  -     HEMOGLOBIN A1C  -     Semaglutide-Weight Management (WEGOVY) 0.25 MG/0.5ML pen; Inject 0.25 mg Subcutaneous once a week    Essential hypertension excellent blood pressure control  -     CBC with Platelets    -     HEMOGLOBIN A1C  -     lisinopril (ZESTRIL) 10 MG tablet; Take 1 tablet (10 mg) by mouth at bedtime  -     triamterene-HCTZ (MAXZIDE-25) 37.5-25 MG tablet; Take 1 tablet by mouth daily  -     Semaglutide-Weight Management (WEGOVY) 0.25 MG/0.5ML pen; Inject 0.25 mg Subcutaneous once a week  -     triamterene-HCTZ (MAXZIDE-25) 37.5-25 MG tablet; Take 1 tablet by mouth daily    Gastroesophageal reflux disease with esophagitis without hemorrhage  On long-term omeprazole and weight loss is recommended  -     CBC with Platelets    -     Vitamin B12  -      Discontinue: omeprazole (PRILOSEC) 20 MG DR capsule; Take 1 capsule (20 mg) by mouth 2 times daily  -     Semaglutide-Weight Management (WEGOVY) 0.25 MG/0.5ML pen; Inject 0.25 mg Subcutaneous once a week  -     omeprazole (PRILOSEC) 20 MG DR capsule; Take 1 capsule (20 mg) by mouth 2 times daily    Subclinical hypothyroidism  -     TSH with free T4 reflex    S/P discectomy  Patient has multiple complications and semaglutide should be considered    -     Semaglutide-Weight Management (WEGOVY) 0.25 MG/0.5ML pen; Inject 0.25 mg Subcutaneous once a week  We discussed the side effect  Family history of ovarian cancer  -     Vitamin D Deficiency  Patient is negative for the gene  Acute UTI (urinary tract infection)  -     nitroFURantoin macrocrystal-monohydrate (MACROBID) 100 MG capsule; Take 1 capsule (100 mg) by mouth 2 times daily  Symptom directed treatment  Special screening for malignant neoplasms, colon  -     Cancel: Colonoscopy Screening  Referral; Future  -     Cancel: COLOGUARD(EXACT SCIENCES); Future    Prediabetes multiple complications and we should start with Wegovy and she is good with diet  -     Semaglutide-Weight Management (WEGOVY) 0.25 MG/0.5ML pen; Inject 0.25 mg Subcutaneous once a week    BMI 32.0-32.9,adult  -     Semaglutide-Weight Management (WEGOVY) 0.25 MG/0.5ML pen; Inject 0.25 mg Subcutaneous once a week    Skin change  -     Adult Dermatology  Referral; Future  Patient should see dermatologist  Other orders  -     PRIMARY CARE FOLLOW-UP SCHEDULING; Future  -     REVIEW OF HEALTH MAINTENANCE PROTOCOL ORDERS  -     INFLUENZA VACCINE >6 MONTHS (AFLURIA/FLUZONE)              COUNSELING:  COVID booster        She reports that she has never smoked. She has never used smokeless tobacco.          Zaira Schultz MD  [facility]

## 2023-12-06 ENCOUNTER — TELEPHONE (OUTPATIENT)
Dept: FAMILY MEDICINE | Facility: CLINIC | Age: 58
End: 2023-12-06
Payer: COMMERCIAL

## 2023-12-06 LAB
BKR LAB AP GYN ADEQUACY: NORMAL
BKR LAB AP GYN INTERPRETATION: NORMAL
BKR LAB AP HPV REFLEX: NORMAL
BKR LAB AP PREVIOUS ABNORMAL: NORMAL
PATH REPORT.COMMENTS IMP SPEC: NORMAL
PATH REPORT.COMMENTS IMP SPEC: NORMAL
PATH REPORT.RELEVANT HX SPEC: NORMAL

## 2023-12-06 NOTE — TELEPHONE ENCOUNTER
MT referral from: [facility] visit (referral by provider)    MT referral outreach attempt #2 on December 6, 2023 at 1:56 PM      Outcome: Patient not reachable after several attempts, will route to [facility] Pharmacist/Provider as an FYI.  [facility] scheduling number is .  Thank you for the referral.    Use bcbs oos gfe needed for the carrier/Plan on the flowsheet      [employer] Message Sent    Kacey Lala  [facility]

## 2023-12-08 LAB
HUMAN PAPILLOMA VIRUS 16 DNA: NEGATIVE
HUMAN PAPILLOMA VIRUS 18 DNA: NEGATIVE
HUMAN PAPILLOMA VIRUS FINAL DIAGNOSIS: NORMAL
HUMAN PAPILLOMA VIRUS OTHER HR: NEGATIVE

## 2024-04-06 ENCOUNTER — LAB (OUTPATIENT)
Dept: LAB | Facility: CLINIC | Age: 59
End: 2024-04-06
Payer: COMMERCIAL

## 2024-04-06 DIAGNOSIS — E66.9 OBESITY (BMI 30-39.9): ICD-10-CM

## 2024-04-06 DIAGNOSIS — M21.372 FOOT DROP, LEFT: ICD-10-CM

## 2024-04-06 DIAGNOSIS — K21.00 GASTROESOPHAGEAL REFLUX DISEASE WITH ESOPHAGITIS WITHOUT HEMORRHAGE: Primary | ICD-10-CM

## 2024-04-06 DIAGNOSIS — Z98.890 S/P HEMILAMINOTOMY: ICD-10-CM

## 2024-04-06 DIAGNOSIS — Z92.21 S/P CHEMOTHERAPY, TIME SINCE GREATER THAN 12 WEEKS: ICD-10-CM

## 2024-04-06 DIAGNOSIS — E78.5 DYSLIPIDEMIA WITH ELEVATED LOW DENSITY LIPOPROTEIN (LDL) CHOLESTEROL AND ABNORMALLY LOW HIGH DENSITY LIPOPROTEIN CHOLESTEROL: ICD-10-CM

## 2024-04-06 DIAGNOSIS — R73.03 PREDIABETES: ICD-10-CM

## 2024-04-06 DIAGNOSIS — E03.8 SUBCLINICAL HYPOTHYROIDISM: ICD-10-CM

## 2024-04-06 LAB
ALBUMIN SERPL BCG-MCNC: 4.4 G/DL (ref 3.5–5.2)
ALP SERPL-CCNC: 77 U/L (ref 40–150)
ALT SERPL W P-5'-P-CCNC: 53 U/L (ref 0–50)
ANION GAP SERPL CALCULATED.3IONS-SCNC: 9 MMOL/L (ref 7–15)
AST SERPL W P-5'-P-CCNC: 51 U/L (ref 0–45)
BILIRUB SERPL-MCNC: 0.4 MG/DL
BUN SERPL-MCNC: 22 MG/DL (ref 6–20)
CALCIUM SERPL-MCNC: 10.2 MG/DL (ref 8.6–10)
CHLORIDE SERPL-SCNC: 102 MMOL/L (ref 98–107)
CHOLEST SERPL-MCNC: 215 MG/DL
CREAT SERPL-MCNC: 0.82 MG/DL (ref 0.51–0.95)
DEPRECATED HCO3 PLAS-SCNC: 28 MMOL/L (ref 22–29)
EGFRCR SERPLBLD CKD-EPI 2021: 82 ML/MIN/1.73M2
FASTING STATUS PATIENT QL REPORTED: YES
GLUCOSE SERPL-MCNC: 94 MG/DL (ref 70–99)
HBA1C MFR BLD: 5.8 % (ref 0–5.6)
HDLC SERPL-MCNC: 60 MG/DL
LDLC SERPL CALC-MCNC: 129 MG/DL
NONHDLC SERPL-MCNC: 155 MG/DL
POTASSIUM SERPL-SCNC: 3.8 MMOL/L (ref 3.4–5.3)
PROT SERPL-MCNC: 7.4 G/DL (ref 6.4–8.3)
SODIUM SERPL-SCNC: 139 MMOL/L (ref 135–145)
TRIGL SERPL-MCNC: 130 MG/DL
TSH SERPL DL<=0.005 MIU/L-ACNC: 2.95 UIU/ML (ref 0.3–4.2)

## 2024-04-06 PROCEDURE — 83036 HEMOGLOBIN GLYCOSYLATED A1C: CPT

## 2024-04-06 PROCEDURE — 80053 COMPREHEN METABOLIC PANEL: CPT

## 2024-04-06 PROCEDURE — 36415 COLL VENOUS BLD VENIPUNCTURE: CPT

## 2024-04-06 PROCEDURE — 80061 LIPID PANEL: CPT

## 2024-04-06 PROCEDURE — 84443 ASSAY THYROID STIM HORMONE: CPT

## 2024-04-06 NOTE — LETTER
Bertha Hollingsworth.  Female, 58 year old, 1965    This is to certify that this patient is at a very high risk of coronary events and progression of her back arthritis. She had laminectomy done in the past and has a foot drop. She has AGUILAR, GERD, hypertension hyperlipidemia and Prediabetes and is s/p chemotherapy. She has BMI over 30. She should receive GLP agonists. Please let me know if you have any questions.      BOBBI ROA M.D., F.A.C.P

## 2024-04-24 ENCOUNTER — TELEPHONE (OUTPATIENT)
Dept: FAMILY MEDICINE | Facility: CLINIC | Age: 59
End: 2024-04-24
Payer: COMMERCIAL

## 2024-04-24 NOTE — TELEPHONE ENCOUNTER
Prior Authorization Retail Medication Request    Medication/Dose: Zepbound 2.5 mg/0.5 mL  Diagnosis and ICD code (if different than what is on RX):  E66.9  New/renewal/insurance change PA/secondary ins. PA:  Previously Tried and Failed:  None  Rationale:  Patient with BMI of 31.85 and prediabetes that would benefit from weight loss medication    Insurance   Primary: [employer]  Insurance ID:  [id number]    Secondary (if applicable):  Insurance ID:      Pharmacy Information (if different than what is on RX)  Name:  [facility]  Phone:  [phone]  Fax:[phone]

## 2024-04-25 DIAGNOSIS — E78.5 DYSLIPIDEMIA WITH ELEVATED LOW DENSITY LIPOPROTEIN (LDL) CHOLESTEROL AND ABNORMALLY LOW HIGH DENSITY LIPOPROTEIN CHOLESTEROL: Primary | ICD-10-CM

## 2024-04-25 DIAGNOSIS — R73.03 PREDIABETES: ICD-10-CM

## 2024-04-25 DIAGNOSIS — R74.8 ELEVATED LIVER ENZYMES: ICD-10-CM

## 2024-04-25 DIAGNOSIS — Z98.890 S/P HEMILAMINOTOMY: ICD-10-CM

## 2024-04-25 DIAGNOSIS — Z92.21 S/P CHEMOTHERAPY, TIME SINCE GREATER THAN 12 WEEKS: ICD-10-CM

## 2024-05-08 NOTE — TELEPHONE ENCOUNTER
Central Prior Authorization Team   Phone: [phone]    PA Initiation    Medication: Zepbound 2.5 mg/0.5 mL  Insurance Company: [employer] Non-Specialty PA's - Phone [phone] Fax [phone]  Pharmacy Filling the Rx: [facility] - [place], [place] - [facility]  Filling Pharmacy Phone: [phone]  Filling Pharmacy Fax:    Start Date: 5/8/2024

## 2024-05-10 NOTE — TELEPHONE ENCOUNTER
Prior Authorization Approval    Authorization Effective Date: 4/8/2024  Authorization Expiration Date: 1/3/2025  Medication: Zepbound 2.5 mg/0.5 mL  Approved Dose/Quantity:   Reference #:     Insurance Company: [employer] Non-Specialty PA's - Phone [phone] Fax [phone]  Expected CoPay:       CoPay Card Available:      Foundation Assistance Needed:    Which Pharmacy is filling the prescription (Not needed for infusion/clinic administered): [facility] - [place], [place] - [facility] [address]  Pharmacy Notified:  yes  Patient Notified:  yes- Pharmacy will contact patient when ready to /ship

## 2024-06-04 ENCOUNTER — MYC REFILL (OUTPATIENT)
Dept: FAMILY MEDICINE | Facility: CLINIC | Age: 59
End: 2024-06-04
Payer: COMMERCIAL

## 2024-06-04 DIAGNOSIS — R21 RASH: ICD-10-CM

## 2024-06-04 RX ORDER — TRIAMCINOLONE ACETONIDE 5 MG/G
CREAM TOPICAL PRN
Qty: 30 G | Refills: 11 | Status: SHIPPED | OUTPATIENT
Start: 2024-06-04 | End: 2024-06-05

## 2024-06-05 RX ORDER — TRIAMCINOLONE ACETONIDE 5 MG/G
CREAM TOPICAL PRN
Qty: 90 G | Refills: 3 | Status: SHIPPED | OUTPATIENT
Start: 2024-06-05

## 2024-06-25 ENCOUNTER — MYC REFILL (OUTPATIENT)
Dept: FAMILY MEDICINE | Facility: CLINIC | Age: 59
End: 2024-06-25
Payer: COMMERCIAL

## 2024-06-25 DIAGNOSIS — Z92.21 S/P CHEMOTHERAPY, TIME SINCE GREATER THAN 12 WEEKS: ICD-10-CM

## 2024-06-25 DIAGNOSIS — R74.8 ELEVATED LIVER ENZYMES: ICD-10-CM

## 2024-06-25 DIAGNOSIS — Z98.890 S/P HEMILAMINOTOMY: ICD-10-CM

## 2024-06-25 DIAGNOSIS — E78.5 DYSLIPIDEMIA WITH ELEVATED LOW DENSITY LIPOPROTEIN (LDL) CHOLESTEROL AND ABNORMALLY LOW HIGH DENSITY LIPOPROTEIN CHOLESTEROL: ICD-10-CM

## 2024-06-25 DIAGNOSIS — E78.5 DYSLIPIDEMIA WITH ELEVATED LOW DENSITY LIPOPROTEIN (LDL) CHOLESTEROL AND ABNORMALLY LOW HIGH DENSITY LIPOPROTEIN CHOLESTEROL: Primary | ICD-10-CM

## 2024-07-02 ENCOUNTER — MYC REFILL (OUTPATIENT)
Dept: FAMILY MEDICINE | Facility: CLINIC | Age: 59
End: 2024-07-02
Payer: COMMERCIAL

## 2024-07-02 DIAGNOSIS — I10 ESSENTIAL HYPERTENSION: ICD-10-CM

## 2024-07-02 RX ORDER — TRIAMTERENE/HYDROCHLOROTHIAZID 37.5-25 MG
TABLET ORAL
Refills: 0 | OUTPATIENT
Start: 2024-07-02

## 2024-07-02 RX ORDER — TRIAMTERENE/HYDROCHLOROTHIAZID 37.5-25 MG
1 TABLET ORAL DAILY
Qty: 90 TABLET | Refills: 0 | Status: SHIPPED | OUTPATIENT
Start: 2024-07-02

## 2024-07-02 RX ORDER — TRIAMTERENE/HYDROCHLOROTHIAZID 37.5-25 MG
1 TABLET ORAL DAILY
Qty: 90 TABLET | Refills: 0 | OUTPATIENT
Start: 2024-07-02

## 2024-07-14 ENCOUNTER — HEALTH MAINTENANCE LETTER (OUTPATIENT)
Age: 59
End: 2024-07-14

## 2024-08-06 DIAGNOSIS — E78.5 DYSLIPIDEMIA WITH ELEVATED LOW DENSITY LIPOPROTEIN (LDL) CHOLESTEROL AND ABNORMALLY LOW HIGH DENSITY LIPOPROTEIN CHOLESTEROL: ICD-10-CM

## 2024-08-06 DIAGNOSIS — Z98.890 S/P HEMILAMINOTOMY: ICD-10-CM

## 2024-08-06 DIAGNOSIS — R74.8 ELEVATED LIVER ENZYMES: ICD-10-CM

## 2024-08-06 DIAGNOSIS — Z92.21 S/P CHEMOTHERAPY, TIME SINCE GREATER THAN 12 WEEKS: ICD-10-CM

## 2024-12-01 ENCOUNTER — HEALTH MAINTENANCE LETTER (OUTPATIENT)
Age: 59
End: 2024-12-01

## 2024-12-12 SDOH — HEALTH STABILITY: PHYSICAL HEALTH: ON AVERAGE, HOW MANY DAYS PER WEEK DO YOU ENGAGE IN MODERATE TO STRENUOUS EXERCISE (LIKE A BRISK WALK)?: 5 DAYS

## 2024-12-12 SDOH — HEALTH STABILITY: PHYSICAL HEALTH: ON AVERAGE, HOW MANY MINUTES DO YOU ENGAGE IN EXERCISE AT THIS LEVEL?: 30 MIN

## 2024-12-12 ASSESSMENT — SOCIAL DETERMINANTS OF HEALTH (SDOH): HOW OFTEN DO YOU GET TOGETHER WITH FRIENDS OR RELATIVES?: ONCE A WEEK

## 2024-12-17 ENCOUNTER — OFFICE VISIT (OUTPATIENT)
Dept: FAMILY MEDICINE | Facility: CLINIC | Age: 59
End: 2024-12-17
Payer: COMMERCIAL

## 2024-12-17 VITALS
HEIGHT: 65 IN | DIASTOLIC BLOOD PRESSURE: 82 MMHG | RESPIRATION RATE: 18 BRPM | HEART RATE: 101 BPM | TEMPERATURE: 97.9 F | BODY MASS INDEX: 29.49 KG/M2 | SYSTOLIC BLOOD PRESSURE: 123 MMHG | WEIGHT: 177 LBS | OXYGEN SATURATION: 99 %

## 2024-12-17 DIAGNOSIS — Z80.41 FAMILY HISTORY OF OVARIAN CANCER: ICD-10-CM

## 2024-12-17 DIAGNOSIS — R73.03 PREDIABETES: ICD-10-CM

## 2024-12-17 DIAGNOSIS — Z98.890 S/P HEMILAMINOTOMY: ICD-10-CM

## 2024-12-17 DIAGNOSIS — R74.8 ELEVATED LIVER ENZYMES: ICD-10-CM

## 2024-12-17 DIAGNOSIS — K21.00 GASTROESOPHAGEAL REFLUX DISEASE WITH ESOPHAGITIS WITHOUT HEMORRHAGE: ICD-10-CM

## 2024-12-17 DIAGNOSIS — Z92.21 S/P CHEMOTHERAPY, TIME SINCE GREATER THAN 12 WEEKS: ICD-10-CM

## 2024-12-17 DIAGNOSIS — I10 ESSENTIAL HYPERTENSION: ICD-10-CM

## 2024-12-17 DIAGNOSIS — E78.5 DYSLIPIDEMIA WITH ELEVATED LOW DENSITY LIPOPROTEIN (LDL) CHOLESTEROL AND ABNORMALLY LOW HIGH DENSITY LIPOPROTEIN CHOLESTEROL: ICD-10-CM

## 2024-12-17 DIAGNOSIS — Z00.00 ROUTINE GENERAL MEDICAL EXAMINATION AT A HEALTH CARE FACILITY: Primary | ICD-10-CM

## 2024-12-17 DIAGNOSIS — L65.9 ALOPECIA: ICD-10-CM

## 2024-12-17 DIAGNOSIS — E78.5 HYPERLIPIDEMIA LDL GOAL <100: ICD-10-CM

## 2024-12-17 DIAGNOSIS — Z23 NEED FOR PROPHYLACTIC VACCINATION AND INOCULATION AGAINST INFLUENZA: ICD-10-CM

## 2024-12-17 DIAGNOSIS — Z90.13 S/P MASTECTOMY, BILATERAL: ICD-10-CM

## 2024-12-17 DIAGNOSIS — Z23 NEED FOR VACCINATION: ICD-10-CM

## 2024-12-17 DIAGNOSIS — E03.8 SUBCLINICAL HYPOTHYROIDISM: ICD-10-CM

## 2024-12-17 DIAGNOSIS — N39.0 ACUTE UTI (URINARY TRACT INFECTION): ICD-10-CM

## 2024-12-17 LAB
ALBUMIN SERPL BCG-MCNC: 4.3 G/DL (ref 3.5–5.2)
ALP SERPL-CCNC: 93 U/L (ref 40–150)
ALT SERPL W P-5'-P-CCNC: 31 U/L (ref 0–50)
ANION GAP SERPL CALCULATED.3IONS-SCNC: 13 MMOL/L (ref 7–15)
AST SERPL W P-5'-P-CCNC: 40 U/L (ref 0–45)
BILIRUB SERPL-MCNC: 0.5 MG/DL
BUN SERPL-MCNC: 13.6 MG/DL (ref 8–23)
CALCIUM SERPL-MCNC: 10.2 MG/DL (ref 8.8–10.4)
CHLORIDE SERPL-SCNC: 103 MMOL/L (ref 98–107)
CHOLEST SERPL-MCNC: 237 MG/DL
CREAT SERPL-MCNC: 0.83 MG/DL (ref 0.51–0.95)
EGFRCR SERPLBLD CKD-EPI 2021: 81 ML/MIN/1.73M2
ERYTHROCYTE [DISTWIDTH] IN BLOOD BY AUTOMATED COUNT: 14.2 % (ref 10–15)
EST. AVERAGE GLUCOSE BLD GHB EST-MCNC: 111 MG/DL
FASTING STATUS PATIENT QL REPORTED: YES
FASTING STATUS PATIENT QL REPORTED: YES
FERRITIN SERPL-MCNC: 139 NG/ML (ref 11–328)
GLUCOSE SERPL-MCNC: 100 MG/DL (ref 70–99)
HBA1C MFR BLD: 5.5 % (ref 0–5.6)
HCO3 SERPL-SCNC: 26 MMOL/L (ref 22–29)
HCT VFR BLD AUTO: 47.8 % (ref 35–47)
HDLC SERPL-MCNC: 66 MG/DL
HGB BLD-MCNC: 15.6 G/DL (ref 11.7–15.7)
LDLC SERPL CALC-MCNC: 148 MG/DL
MCH RBC QN AUTO: 27.7 PG (ref 26.5–33)
MCHC RBC AUTO-ENTMCNC: 32.6 G/DL (ref 31.5–36.5)
MCV RBC AUTO: 85 FL (ref 78–100)
NONHDLC SERPL-MCNC: 171 MG/DL
PLATELET # BLD AUTO: 319 10E3/UL (ref 150–450)
POTASSIUM SERPL-SCNC: 4.1 MMOL/L (ref 3.4–5.3)
PROT SERPL-MCNC: 7.5 G/DL (ref 6.4–8.3)
RBC # BLD AUTO: 5.64 10E6/UL (ref 3.8–5.2)
SODIUM SERPL-SCNC: 142 MMOL/L (ref 135–145)
TRIGL SERPL-MCNC: 117 MG/DL
TSH SERPL DL<=0.005 MIU/L-ACNC: 2.85 UIU/ML (ref 0.3–4.2)
VIT B12 SERPL-MCNC: 1131 PG/ML (ref 232–1245)
VIT D+METAB SERPL-MCNC: 43 NG/ML (ref 20–50)
WBC # BLD AUTO: 7.1 10E3/UL (ref 4–11)

## 2024-12-17 PROCEDURE — 83036 HEMOGLOBIN GLYCOSYLATED A1C: CPT | Performed by: INTERNAL MEDICINE

## 2024-12-17 PROCEDURE — 36415 COLL VENOUS BLD VENIPUNCTURE: CPT | Performed by: INTERNAL MEDICINE

## 2024-12-17 PROCEDURE — 80053 COMPREHEN METABOLIC PANEL: CPT | Performed by: INTERNAL MEDICINE

## 2024-12-17 PROCEDURE — 82728 ASSAY OF FERRITIN: CPT | Performed by: INTERNAL MEDICINE

## 2024-12-17 PROCEDURE — 99396 PREV VISIT EST AGE 40-64: CPT | Mod: 25 | Performed by: INTERNAL MEDICINE

## 2024-12-17 PROCEDURE — 80061 LIPID PANEL: CPT | Performed by: INTERNAL MEDICINE

## 2024-12-17 PROCEDURE — 90673 RIV3 VACCINE NO PRESERV IM: CPT | Performed by: INTERNAL MEDICINE

## 2024-12-17 PROCEDURE — 84443 ASSAY THYROID STIM HORMONE: CPT | Performed by: INTERNAL MEDICINE

## 2024-12-17 PROCEDURE — 90472 IMMUNIZATION ADMIN EACH ADD: CPT | Performed by: INTERNAL MEDICINE

## 2024-12-17 PROCEDURE — 82607 VITAMIN B-12: CPT | Performed by: INTERNAL MEDICINE

## 2024-12-17 PROCEDURE — 82306 VITAMIN D 25 HYDROXY: CPT | Performed by: INTERNAL MEDICINE

## 2024-12-17 PROCEDURE — 85027 COMPLETE CBC AUTOMATED: CPT | Performed by: INTERNAL MEDICINE

## 2024-12-17 PROCEDURE — 90715 TDAP VACCINE 7 YRS/> IM: CPT | Performed by: INTERNAL MEDICINE

## 2024-12-17 PROCEDURE — 90471 IMMUNIZATION ADMIN: CPT | Performed by: INTERNAL MEDICINE

## 2024-12-17 RX ORDER — NITROFURANTOIN 25; 75 MG/1; MG/1
100 CAPSULE ORAL 2 TIMES DAILY
Qty: 14 CAPSULE | Refills: 1 | Status: SHIPPED | OUTPATIENT
Start: 2024-12-17

## 2024-12-17 RX ORDER — MULTIVITAMIN
1 TABLET ORAL DAILY
COMMUNITY

## 2024-12-17 RX ORDER — LISINOPRIL 10 MG/1
10 TABLET ORAL AT BEDTIME
Qty: 90 TABLET | Refills: 3 | Status: SHIPPED | OUTPATIENT
Start: 2024-12-17

## 2024-12-17 RX ORDER — FAMOTIDINE 20 MG/1
20 TABLET, FILM COATED ORAL 2 TIMES DAILY
Qty: 90 TABLET | Refills: 3 | Status: SHIPPED | OUTPATIENT
Start: 2024-12-17

## 2024-12-17 RX ORDER — SPIRONOLACTONE 50 MG/1
50 TABLET, FILM COATED ORAL DAILY
Qty: 90 TABLET | Refills: 3 | Status: SHIPPED | OUTPATIENT
Start: 2024-12-17

## 2024-12-17 RX ORDER — TIRZEPATIDE 7.5 MG/.5ML
7.5 INJECTION, SOLUTION SUBCUTANEOUS
Qty: 2 ML | Refills: 0 | Status: SHIPPED | OUTPATIENT
Start: 2024-12-17

## 2024-12-17 RX ORDER — CHOLECALCIFEROL (VITAMIN D3) 50 MCG
1 TABLET ORAL DAILY
COMMUNITY

## 2024-12-17 RX ORDER — TRIAMTERENE AND HYDROCHLOROTHIAZIDE 37.5; 25 MG/1; MG/1
1 TABLET ORAL DAILY
Qty: 90 TABLET | Refills: 3 | Status: SHIPPED | OUTPATIENT
Start: 2024-12-17 | End: 2024-12-17

## 2024-12-17 RX ORDER — TIRZEPATIDE 5 MG/.5ML
5 INJECTION, SOLUTION SUBCUTANEOUS
Qty: 2 ML | Refills: 0 | Status: SHIPPED | OUTPATIENT
Start: 2024-12-17

## 2024-12-17 ASSESSMENT — PAIN SCALES - GENERAL: PAINLEVEL_OUTOF10: MILD PAIN (3)

## 2024-12-17 NOTE — PROGRESS NOTES
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 and who is doing very well  She has lost 15 pounds of weight but stopped taking her GLP agonist but it was unavailable  She is exercising on the bike and is up to date on immunizations and she will take a flu shot  I will recommend COVID booster and Shingrix to her  Skin checkup is recommended every year because of breast cancer history  Essential hypertension  Patient's blood pressures under excellent control in the record at home was done  - CBC with Platelets    - lisinopril (ZESTRIL) 10 MG tablet  Dispense: 90 tablet; Refill: 3  - CBC with Platelets    - tirzepatide-Weight Management (ZEPBOUND) 2.5 MG/0.5ML prefilled pen  Dispense: 2 mL; Refill: 0  - ZEPBOUND 5 MG/0.5ML prefilled pen  Dispense: 2 mL; Refill: 0  - ZEPBOUND 7.5 MG/0.5ML prefilled pen  Dispense: 2 mL; Refill: 0  - spironolactone (ALDACTONE) 50 MG tablet  Dispense: 90 tablet; Refill: 3  - **Basic metabolic panel FUTURE 14d    Prediabetes  With weight loss I expect this to improve  - Hemoglobin A1c  - Hemoglobin A1c  - tirzepatide-Weight Management (ZEPBOUND) 2.5 MG/0.5ML prefilled pen  Dispense: 2 mL; Refill: 0  - ZEPBOUND 5 MG/0.5ML prefilled pen  Dispense: 2 mL; Refill: 0  - ZEPBOUND 7.5 MG/0.5ML prefilled pen  Dispense: 2 mL; Refill: 0    S/P hemilaminotomy  Patient has left-sided foot drop  - tirzepatide-Weight Management (ZEPBOUND) 2.5 MG/0.5ML prefilled pen  Dispense: 2 mL; Refill: 0  - ZEPBOUND 5 MG/0.5ML prefilled pen  Dispense: 2 mL; Refill: 0  - ZEPBOUND 7.5 MG/0.5ML prefilled pen  Dispense: 2 mL; Refill: 0  - Ferritin    S/P chemotherapy, time since greater than 12 weeks  Unfortunately patient had stage II breast cancer s/p radiation and no therapy for the left side  She had bilateral  mastectomy with bilateral implants done  - tirzepatide-Weight Management (ZEPBOUND) 2.5 MG/0.5ML prefilled pen  Dispense: 2 mL; Refill: 0  - ZEPBOUND 5 MG/0.5ML prefilled pen  Dispense: 2 mL; Refill: 0  - ZEPBOUND 7.5 MG/0.5ML prefilled pen  Dispense: 2 mL; Refill: 0    S/P mastectomy, bilateral  As above    Elevated liver enzymes  They should improve with the weight loss we will check them  - Comprehensive metabolic panel  - Vitamin D Deficiency  - Comprehensive metabolic panel  - Vitamin D Deficiency  - tirzepatide-Weight Management (ZEPBOUND) 2.5 MG/0.5ML prefilled pen  Dispense: 2 mL; Refill: 0  - ZEPBOUND 5 MG/0.5ML prefilled pen  Dispense: 2 mL; Refill: 0  - ZEPBOUND 7.5 MG/0.5ML prefilled pen  Dispense: 2 mL; Refill: 0  - Ferritin    Dyslipidemia with elevated low density lipoprotein (LDL) cholesterol and abnormally low high density lipoprotein cholesterol  Definitely abdominal fat has to improve so we will continue with GLP agonist because she is at very high risk of cardiovascular events  - Comprehensive metabolic panel  - Lipid panel reflex to direct LDL Fasting  - Vitamin D Deficiency  - Comprehensive metabolic panel  - Lipid panel reflex to direct LDL Fasting  - Vitamin D Deficiency  - tirzepatide-Weight Management (ZEPBOUND) 2.5 MG/0.5ML prefilled pen  Dispense: 2 mL; Refill: 0  - ZEPBOUND 5 MG/0.5ML prefilled pen  Dispense: 2 mL; Refill: 0  - ZEPBOUND 7.5 MG/0.5ML prefilled pen  Dispense: 2 mL; Refill: 0    Family history of ovarian cancer  As above patient is negative genetically for this  - Vitamin D Deficiency  - Vitamin D Deficiency    Gastroesophageal reflux disease with esophagitis without hemorrhage  We will try to change Prilosec to famotidine  - Vitamin B12  - Vitamin D Deficiency  - Vitamin B12  - Vitamin D Deficiency  - tirzepatide-Weight Management (ZEPBOUND) 2.5 MG/0.5ML prefilled pen  Dispense: 2 mL; Refill: 0  - ZEPBOUND 5 MG/0.5ML prefilled pen  Dispense: 2 mL; Refill: 0  - ZEPBOUND 7.5  MG/0.5ML prefilled pen  Dispense: 2 mL; Refill: 0  - omeprazole (PRILOSEC) 20 MG DR capsule  Dispense: 180 capsule; Refill: 3  - famotidine (PEPCID) 20 MG tablet  Dispense: 90 tablet; Refill: 3    Subclinical hypothyroidism    - TSH with free T4 reflex  - TSH with free T4 reflex  - tirzepatide-Weight Management (ZEPBOUND) 2.5 MG/0.5ML prefilled pen  Dispense: 2 mL; Refill: 0  - ZEPBOUND 5 MG/0.5ML prefilled pen  Dispense: 2 mL; Refill: 0  - ZEPBOUND 7.5 MG/0.5ML prefilled pen  Dispense: 2 mL; Refill: 0    Hyperlipidemia LDL goal <100    - tirzepatide-Weight Management (ZEPBOUND) 2.5 MG/0.5ML prefilled pen  Dispense: 2 mL; Refill: 0  - ZEPBOUND 5 MG/0.5ML prefilled pen  Dispense: 2 mL; Refill: 0  - ZEPBOUND 7.5 MG/0.5ML prefilled pen  Dispense: 2 mL; Refill: 0    Acute UTI (urinary tract infection)  Patient takes symptom directed treatment and she will keep it on hand  - nitroFURantoin macrocrystal-monohydrate (MACROBID) 100 MG capsule  Dispense: 14 capsule; Refill: 1  - ZEPBOUND 5 MG/0.5ML prefilled pen  Dispense: 2 mL; Refill: 0  - ZEPBOUND 7.5 MG/0.5ML prefilled pen  Dispense: 2 mL; Refill: 0    BMI 32.0-32.9,adult  BMI has dropped to 29.45  - tirzepatide-Weight Management (ZEPBOUND) 2.5 MG/0.5ML prefilled pen  Dispense: 2 mL; Refill: 0  - ZEPBOUND 5 MG/0.5ML prefilled pen  Dispense: 2 mL; Refill: 0  - ZEPBOUND 7.5 MG/0.5ML prefilled pen  Dispense: 2 mL; Refill: 0    Alopecia  We will change Maxide to spironolactone which I think will help her with alopecia we will check the labs and in addition we will consider Propecia    - spironolactone (ALDACTONE) 50 MG tablet  Dispense: 90 tablet; Refill: 3  - Ferritin    Need for vaccination  Flu shot given today but we should consider Shingrix    Need for prophylactic vaccination and inoculation against influenza    Malignant Neoplasm Of Breast Female Left (HCC)   5/5/2014 Initial Diagnosis   Malignant Neoplasm Of Breast Female Left (HCC)    April 28, 2014, patient  "underwent screening mammogram, which showed a suspicious lesion. Ultrasound confirmed a suspicious lesion to be 1.7 cm.     May 1, 2014, patient underwent axillary ultrasound, which showed suspicious lymph nodes.       5/5/2014 Biopsy/Pathology   Patient underwent biopsy of the left axillary lymph node and the left breast lesion. The biopsy was consistent with ductal carcinoma in both. The lesion was grade III (of III), ER/NJ negative, HER-2 positive with a score of 3+ on immunohistochemistry. Ki-67 was 58.8.       5/8/2014 Other   PET scan showed FDG-avid activity in the left breast and left axilla. There were no other suspicious lesions.        5/30/2014 - Biological/Targeted/Hormone Therapy   Patient initiated her neoadjuvant therapy on the randomized arm with pertuzumab plus T-DM1.       8/29/2014 Biopsy/Pathology   Patient's right-sided lymph node biopsy was negative for any malignancy. This was performed after MRI of the breast showed suspicious lymph nodes in the right axilla. The same MRI showed excellent response of her primary tumor and left-sided adenopathy.        9/3/2014 - 10/15/2014 Chemotherapy   Patient started with cycle number one of AC.    October 15, 2014, patient finished neoadjuvant therapy.     Patient did Cologuard for cancer screening but  We should do colonoscopy        BMI  Estimated body mass index is 29.45 kg/m  as calculated from the following:    Height as of this encounter: 1.651 m (5' 5\").    Weight as of this encounter: 80.3 kg (177 lb).       Counseling  Appropriate preventive services were addressed with this patient via screening, questionnaire, or discussion as appropriate for fall prevention, nutrition, physical activity, , social engagement, weight loss and cognition.  Checklist reviewing preventive services available has been given to the patient.  Reviewed patient's diet, addressing concerns and/or questions.           Job Stone is a 59 year old, presenting for the " following:  Physical and Imm/Inj (Flu Shot)  Blair has done very well with 15 pounds weight loss but she had to stop taking Ozempic because of nonavailability  Zepbound is available  Her heartburn is stable and she is taking Prilosec every other day  She has left-sided foot drop from previous back surgery  But she does stationary bike 30 minutes/day and her diet is pretty good  She has not had a recent UTI and her other symptoms are stable  Malignant Neoplasm Of Breast Female Left (HCC)   5/5/2014 Initial Diagnosis   Malignant Neoplasm Of Breast Female Left (HCC)    April 28, 2014, patient underwent screening mammogram, which showed a suspicious lesion. Ultrasound confirmed a suspicious lesion to be 1.7 cm.     May 1, 2014, patient underwent axillary ultrasound, which showed suspicious lymph nodes.       5/5/2014 Biopsy/Pathology   Patient underwent biopsy of the left axillary lymph node and the left breast lesion. The biopsy was consistent with ductal carcinoma in both. The lesion was grade III (of III), ER/AK negative, HER-2 positive with a score of 3+ on immunohistochemistry. Ki-67 was 58.8.       5/8/2014 Other   PET scan showed FDG-avid activity in the left breast and left axilla. There were no other suspicious lesions.        5/30/2014 - Biological/Targeted/Hormone Therapy   Patient initiated her neoadjuvant therapy on the randomized arm with pertuzumab plus T-DM1.       8/29/2014 Biopsy/Pathology   Patient's right-sided lymph node biopsy was negative for any malignancy. This was performed after MRI of the breast showed suspicious lymph nodes in the right axilla. The same MRI showed excellent response of her primary tumor and left-sided adenopathy.        9/3/2014 - 10/15/2014 Chemotherapy   Patient started with cycle number one of AC.    October 15, 2014, patient finished neoadjuvant therapy.       12/17/2024     9:45 AM   Additional Questions   Roomed by [facility]  Directive  Patient does not have a Health Care Directive: Discussed advance care planning with patient; information given to patient to review.      12/12/2024   General Health   How would you rate your overall physical health? Good   Feel stress (tense, anxious, or unable to sleep) Only a little      (!) STRESS CONCERN      12/12/2024   Nutrition   Three or more servings of calcium each day? Yes   Diet: Regular (no restrictions)   How many servings of fruit and vegetables per day? (!) 2-3   How many sweetened beverages each day? 0-1            12/12/2024   Exercise   Days per week of moderate/strenous exercise 5 days   Average minutes spent exercising at this level 30 min            12/12/2024   Social Factors   Frequency of gathering with friends or relatives Once a week   Worry food won't last until get money to buy more No   Food not last or not have enough money for food? No   Do you have housing? (Housing is defined as stable permanent housing and does not include staying ouside in a car, in a tent, in an abandoned building, in an overnight shelter, or couch-surfing.) Yes   Are you worried about losing your housing? No   Lack of transportation? No   Unable to get utilities (heat,electricity)? No            12/12/2024   Fall Risk   Fallen 2 or more times in the past year? No    Trouble with walking or balance? No        Patient-reported          12/12/2024   Dental   Dentist two times every year? Yes            12/12/2024   TB Screening   Were you born outside of the [place]? No            Today's PHQ-2 Score:       12/16/2024    10:24 AM   PHQ-2 ( 1999 [employer])   Q1: Little interest or pleasure in doing things 0    Q2: Feeling down, depressed or hopeless 0    PHQ-2 Score 0    Q1: Little interest or pleasure in doing things Not at all   Q2: Feeling down, depressed or hopeless Not at all   PHQ-2 Score 0       Patient-reported           12/12/2024   Substance Use   Alcohol more than 3/day or more than 7/wk No   Do you  use any other substances recreationally? No        Social History     Tobacco Use    Smoking status: Never    Smokeless tobacco: Never   Vaping Use    Vaping status: Never Used   Substance Use Topics    Alcohol use: Yes     Comment: rare maybe 1-2 drinks a YEAR     Drug use: Never           11/24/2023   LAST FHS-7 RESULTS   1st degree relative breast or ovarian cancer Yes    Any relative bilateral breast cancer No    Any male have breast cancer No    Any woman with breast cancer before 50yrs No    2 or more relatives with breast AND/OR ovarian cancer No    2 or more relatives with breast AND/OR bowel cancer No        Patient-reported        Mammo Decision Support - Begin breast cancer screening at age 25 or 8 years after chest radiation treatment with alternating annual mammography and breast MRI        12/12/2024   STI Screening   New sexual partner(s) since last STI/HIV test? No        History of abnormal Pap smear: No - age 21-29 PAP every 3 years recommended        Latest Ref Rng & Units 12/1/2023    10:41 AM 10/19/2020    11:44 AM 10/19/2020    11:11 AM   PAP / HPV   PAP  Negative for Intraepithelial Lesion or Malignancy (NILM)      PAP (Historical)    NIL    HPV 16 DNA Negative Negative  Negative     HPV 18 DNA Negative Negative  Negative     Other HR HPV Negative Negative  Negative       ASCVD Risk   The 10-year ASCVD risk score (Sofy GONZALES, et al., 2019) is: 3.7%    Values used to calculate the score:      Age: 59 years      Sex: Female      Is Non- : No      Diabetic: No      Tobacco smoker: No      Systolic Blood Pressure: 123 mmHg      Is BP treated: Yes      HDL Cholesterol: 60 mg/dL      Total Cholesterol: 215 mg/dL           Reviewed and updated as needed this visit by Provider     Meds  Problems    Fam Hx            BP Readings from Last 3 Encounters:   12/17/24 123/82   12/01/23 130/84   11/25/22 117/79    Wt Readings from Last 3 Encounters:   12/17/24 80.3 kg (177  lb)   12/01/23 87.1 kg (192 lb)   11/25/22 86.2 kg (190 lb)                  Patient Active Problem List   Diagnosis    S/P chemotherapy, time since greater than 12 weeks    S/P hemilaminotomy    S/P silicone breast implant    S/P mastectomy, bilateral    Family history of ovarian cancer    Dyslipidemia with elevated low density lipoprotein (LDL) cholesterol and abnormally low high density lipoprotein cholesterol    Subclinical hypothyroidism     Past Surgical History:   Procedure Laterality Date    AS LIGATE FALLOPIAN TUBE      BACK SURGERY      HC REVISE BREAST RECONSTRUCTION      2015.2016    HYSTEROSCOPY      2014    TONSILLECTOMY      ZZC ANTERIOR LUMBAR DISCECTOMY      2001       Social History     Tobacco Use    Smoking status: Never    Smokeless tobacco: Never   Substance Use Topics    Alcohol use: Yes     Comment: rare maybe 1-2 drinks a YEAR      Family History   Problem Relation Age of Onset    Ovarian Cancer Mother     Bipolar Disorder Sister     Diabetes Sister     Mental Illness Sister     Alcoholism Brother         remission    Diabetic Kidney Disease Maternal Grandmother     Diabetes Maternal Grandmother     Hyperlipidemia Maternal Grandmother     Cerebrovascular Disease Maternal Grandmother     Osteoporosis Maternal Grandmother     Hypertension Maternal Grandmother     Prostate Cancer Maternal Grandfather     Sleep Apnea Son     Hypertension Son     Cerebrovascular Disease Maternal Aunt          Current Outpatient Medications   Medication Sig Dispense Refill    cyclobenzaprine (FLEXERIL) 10 MG tablet TAKE 1 TABLET THREE TIMES A DAY AS NEEDED FOR MUSCLE SPASMS 270 tablet 3    famotidine (PEPCID) 20 MG tablet Take 1 tablet (20 mg) by mouth 2 times daily. 90 tablet 3    lidocaine-prilocaine (EMLA) 2.5-2.5 % external cream Apply topically daily profile 30 g 4    lisinopril (ZESTRIL) 10 MG tablet Take 1 tablet (10 mg) by mouth at bedtime. 90 tablet 3    loratadine (CLARITIN) 10 MG tablet Take 1 tablet by  mouth daily      multivitamin w/minerals (MULTI-VITAMIN) tablet Take 1 tablet by mouth daily.      nitroFURantoin macrocrystal-monohydrate (MACROBID) 100 MG capsule Take 1 capsule (100 mg) by mouth 2 times daily. 14 capsule 1    omeprazole (PRILOSEC) 20 MG DR capsule Take 1 capsule (20 mg) by mouth 2 times daily. 180 capsule 3    spironolactone (ALDACTONE) 50 MG tablet Take 1 tablet (50 mg) by mouth daily. 90 tablet 3    tirzepatide-Weight Management (ZEPBOUND) 2.5 MG/0.5ML prefilled pen Inject 0.5 mLs (2.5 mg) subcutaneously every 7 days. 2 mL 0    triamcinolone (ARISTOCORT HP) 0.5 % external cream Apply topically as needed for irritation 90 g 3    vitamin D3 (CHOLECALCIFEROL) 50 mcg (2000 units) tablet Take 1 tablet by mouth daily.      ZEPBOUND 5 MG/0.5ML prefilled pen Inject 0.5 mLs (5 mg) subcutaneously every 7 days. 2 mL 0    ZEPBOUND 7.5 MG/0.5ML prefilled pen Inject 0.5 mLs (7.5 mg) subcutaneously every 7 days. 2 mL 0    vitamin D3 (CHOLECALCIFEROL) 89463 units capsule Take 1 capsule (50,000 Units) by mouth once a week (Patient not taking: Reported on 12/17/2024) 12 capsule 0     Allergies   Allergen Reactions    No Known Allergies      Recent Labs   Lab Test 12/17/24  0914 04/06/24  1033 12/01/23  0942 11/25/22  1118 10/29/21  0926 10/19/20  1138 11/04/19  1420   A1C 5.5 5.8* 5.7*  --   --   --  5.4   LDL  --  129* 134* 119*   < > 112* 143*   HDL  --  60 62 60   < > 69 62   TRIG  --  130 132 139   < > 140 129   ALT  --  53* 68* 47*   < > 49 51*   CR  --  0.82 0.85 0.80   < > 0.76 0.87   GFRESTIMATED  --  82 79 85   < > 89 75   GFRESTBLACK  --   --   --   --   --  >90 87   POTASSIUM  --  3.8 3.8 4.1   < > 3.7 3.3*   TSH  --  2.95 4.52* 3.40   < > 3.55 2.62    < > = values in this interval not displayed.          Review of Systems  Constitutional, HEENT, cardiovascular, pulmonary, GI, , musculoskeletal, neuro, skin, endocrine and psych systems are negative, except as otherwise noted.     Objective   "  Exam  /82   Pulse 101   Temp 97.9  F (36.6  C) (Temporal)   Resp 18   Ht 1.651 m (5' 5\")   Wt 80.3 kg (177 lb)   LMP 01/01/2014 (Approximate)   SpO2 99%   Breastfeeding No   BMI 29.45 kg/m     Estimated body mass index is 29.45 kg/m  as calculated from the following:    Height as of this encounter: 1.651 m (5' 5\").    Weight as of this encounter: 80.3 kg (177 lb).    Physical Exam  GENERAL: alert and no distress  EYES: Eyes grossly normal to inspection, PERRL and conjunctivae and sclerae normal  HENT: ear canals and TM's normal, nose and mouth without ulcers or lesions  NECK: no adenopathy, no asymmetry, masses, or scars  RESP: lungs clear to auscultation - no rales, rhonchi or wheezes  BREAST: no palpable axillary masses or adenopathy and bilateral implants with left-sided radiation scar  CV: regular rate and rhythm, normal S1 S2, no S3 or S4, no murmur, click or rub, no peripheral edema  ABDOMEN: soft, nontender, no hepatosplenomegaly, no masses and bowel sounds normal  MS: no gross musculoskeletal defects noted, no edema  SKIN: no suspicious lesions or rashes and multiple benign nevi  NEURO: Normal strength and tone, mentation intact and speech normal  PSYCH: mentation appears normal, affect normal/bright        Signed Electronically by: Zaira Schultz MD    "

## 2024-12-17 NOTE — NURSING NOTE
Prior to immunization administration, verified patients identity using patient s name and date of birth. Please see Immunization Activity for additional information.     Screening Questionnaire for Adult Immunization    Are you sick today?   No   Do you have allergies to medications, food, a vaccine component or latex?   No   Have you ever had a serious reaction after receiving a vaccination?   No   Do you have a long-term health problem with heart, lung, kidney, or metabolic disease (e.g., diabetes), asthma, a blood disorder, no spleen, complement component deficiency, a cochlear implant, or a spinal fluid leak?  Are you on long-term aspirin therapy?   No   Do you have cancer, leukemia, HIV/AIDS, or any other immune system problem?   No   Do you have a parent, brother, or sister with an immune system problem?   No   In the past 3 months, have you taken medications that affect  your immune system, such as prednisone, other steroids, or anticancer drugs; drugs for the treatment of rheumatoid arthritis, Crohn s disease, or psoriasis; or have you had radiation treatments?   No   Have you had a seizure, or a brain or other nervous system problem?   No   During the past year, have you received a transfusion of blood or blood    products, or been given immune (gamma) globulin or antiviral drug?   No   For women: Are you pregnant or is there a chance you could become       pregnant during the next month?   No   Have you received any vaccinations in the past 4 weeks?   No     Immunization questionnaire answers were all negative.      Patient instructed to remain in clinic for 15 minutes afterwards, and to report any adverse reactions.     Screening performed by Samanta Hong CMA on 12/17/2024 at 10:49 AM.

## 2025-01-21 ENCOUNTER — MYC MEDICAL ADVICE (OUTPATIENT)
Dept: FAMILY MEDICINE | Facility: CLINIC | Age: 60
End: 2025-01-21
Payer: COMMERCIAL

## 2025-01-21 NOTE — TELEPHONE ENCOUNTER
See prior authorization encounter for further documentation.    Terence Hull, CMA on 1/21/2025 at 1:49 PM

## 2025-01-21 NOTE — LETTER
January 21, 2025      Bertha Hollingsworth  [address]  [place] [address]        To Whom It May Concern,       This patient has been on GLP agonists for BMI over 32,  hypertension,high lipids, GERD, prediabetes. Bertha has history of chemotherapy for breast cancer and neuropathy and also foot drop from back surgery. She is much better clinically and also bio chemically on Zepbound.  Please continue this Medicine for her.    Sincerely,        Zaira Schultz MD    Electronically signed

## 2025-01-21 NOTE — TELEPHONE ENCOUNTER
Good day Dr. Schultz,      Please review patient's MyChart message and advise.    Kaila BURNETT MA on 1/21/2025 at 12:32 PM

## 2025-03-19 DIAGNOSIS — E78.5 HYPERLIPIDEMIA LDL GOAL <100: ICD-10-CM

## 2025-03-19 DIAGNOSIS — Z98.890 S/P HEMILAMINOTOMY: ICD-10-CM

## 2025-03-19 DIAGNOSIS — R73.03 PREDIABETES: ICD-10-CM

## 2025-03-19 DIAGNOSIS — E78.5 DYSLIPIDEMIA WITH ELEVATED LOW DENSITY LIPOPROTEIN (LDL) CHOLESTEROL AND ABNORMALLY LOW HIGH DENSITY LIPOPROTEIN CHOLESTEROL: ICD-10-CM

## 2025-03-19 DIAGNOSIS — E03.8 SUBCLINICAL HYPOTHYROIDISM: ICD-10-CM

## 2025-03-19 DIAGNOSIS — R74.8 ELEVATED LIVER ENZYMES: ICD-10-CM

## 2025-03-19 DIAGNOSIS — I10 ESSENTIAL HYPERTENSION: ICD-10-CM

## 2025-03-19 DIAGNOSIS — N39.0 ACUTE UTI (URINARY TRACT INFECTION): ICD-10-CM

## 2025-03-19 DIAGNOSIS — K21.00 GASTROESOPHAGEAL REFLUX DISEASE WITH ESOPHAGITIS WITHOUT HEMORRHAGE: ICD-10-CM

## 2025-03-19 DIAGNOSIS — Z92.21 S/P CHEMOTHERAPY, TIME SINCE GREATER THAN 12 WEEKS: ICD-10-CM

## 2025-04-05 ENCOUNTER — HEALTH MAINTENANCE LETTER (OUTPATIENT)
Age: 60
End: 2025-04-05

## 2025-05-22 DIAGNOSIS — N39.0 ACUTE UTI (URINARY TRACT INFECTION): ICD-10-CM

## 2025-05-22 DIAGNOSIS — Z92.21 S/P CHEMOTHERAPY, TIME SINCE GREATER THAN 12 WEEKS: ICD-10-CM

## 2025-05-22 DIAGNOSIS — R73.03 PREDIABETES: ICD-10-CM

## 2025-05-22 DIAGNOSIS — R74.8 ELEVATED LIVER ENZYMES: ICD-10-CM

## 2025-05-22 DIAGNOSIS — E78.5 DYSLIPIDEMIA WITH ELEVATED LOW DENSITY LIPOPROTEIN (LDL) CHOLESTEROL AND ABNORMALLY LOW HIGH DENSITY LIPOPROTEIN CHOLESTEROL: ICD-10-CM

## 2025-05-22 DIAGNOSIS — K21.00 GASTROESOPHAGEAL REFLUX DISEASE WITH ESOPHAGITIS WITHOUT HEMORRHAGE: ICD-10-CM

## 2025-05-22 DIAGNOSIS — E78.5 HYPERLIPIDEMIA LDL GOAL <100: ICD-10-CM

## 2025-05-22 DIAGNOSIS — E03.8 SUBCLINICAL HYPOTHYROIDISM: ICD-10-CM

## 2025-05-22 DIAGNOSIS — Z98.890 S/P HEMILAMINOTOMY: ICD-10-CM

## 2025-05-22 DIAGNOSIS — I10 ESSENTIAL HYPERTENSION: ICD-10-CM

## 2025-07-01 ENCOUNTER — NURSE TRIAGE (OUTPATIENT)
Dept: FAMILY MEDICINE | Facility: CLINIC | Age: 60
End: 2025-07-01
Payer: COMMERCIAL

## 2025-07-01 DIAGNOSIS — M54.12 CERVICAL RADICULOPATHY: Primary | ICD-10-CM

## 2025-07-02 NOTE — TELEPHONE ENCOUNTER
Called pt, pt stated she already spoke with Dr. Schultz regarding [facility] message. MRI is scheduled for 7/15, no further needs or questions from pt at this time.     Nataliia Wilson RN on 7/2/2025 at 9:32 AM

## 2025-07-09 NOTE — TELEPHONE ENCOUNTER
SPINE PATIENTS - NEW PROTOCOL PREVISIT     RECORDS RECEVEIVED FROM: Referred by Dr. Zaira Schultz    REASON FOR VISIT: Cervical radiculopathy   PROVIDER: [facility]   DATE OF APPT: 08/06/2025     NOTES (FOR ALL VISITS) STATUS DETAILS   OFFICE NOTE from referring provider  Referral and notes in chart   OFFICE NOTE from other specialist     DISCHARGE SUMMARY from hospital     DISCHARGE REPORT from ER     OPERATIVE REPORT     EMG REPORT     Injection     Physical therapy       IMAGING  (FOR ALL VISITS) STATUS DETAILS   MRI (HEAD, NECK, SPINE) In process MR cervical 07/17/2025   XRAY (SPINE) *NEUROSURGERY*     CT (HEAD, NECK, SPINE)          Does patient have C2C?  Year last updated Action     YES   [x]   2019   Please update at appointment if outdated more than 5 years       NO     []   N/A   Please complete C2C at appointment

## 2025-07-19 ENCOUNTER — HEALTH MAINTENANCE LETTER (OUTPATIENT)
Age: 60
End: 2025-07-19

## 2025-07-28 ENCOUNTER — MYC MEDICAL ADVICE (OUTPATIENT)
Dept: FAMILY MEDICINE | Facility: CLINIC | Age: 60
End: 2025-07-28
Payer: COMMERCIAL

## 2025-07-29 ENCOUNTER — MYC MEDICAL ADVICE (OUTPATIENT)
Dept: FAMILY MEDICINE | Facility: CLINIC | Age: 60
End: 2025-07-29

## 2025-07-29 ENCOUNTER — VIRTUAL VISIT (OUTPATIENT)
Dept: FAMILY MEDICINE | Facility: CLINIC | Age: 60
End: 2025-07-29
Payer: COMMERCIAL

## 2025-07-29 DIAGNOSIS — I10 ESSENTIAL HYPERTENSION: ICD-10-CM

## 2025-07-29 DIAGNOSIS — Z98.890 S/P HEMILAMINOTOMY: ICD-10-CM

## 2025-07-29 DIAGNOSIS — Z80.41 FAMILY HISTORY OF OVARIAN CANCER: ICD-10-CM

## 2025-07-29 DIAGNOSIS — Z90.13 S/P MASTECTOMY, BILATERAL: ICD-10-CM

## 2025-07-29 DIAGNOSIS — M54.12 CERVICAL RADICULOPATHY: Primary | ICD-10-CM

## 2025-07-29 DIAGNOSIS — Z85.3 PERSONAL HISTORY OF MALIGNANT NEOPLASM OF BREAST: ICD-10-CM

## 2025-07-29 PROCEDURE — 98005 SYNCH AUDIO-VIDEO EST LOW 20: CPT | Performed by: INTERNAL MEDICINE

## 2025-07-29 NOTE — PROGRESS NOTES
Bertha is a 59 year old who is being evaluated via a billable video visit.    How would you like to obtain your AVS? MyChart  If the video visit is dropped, the invitation should be resent by:   Will anyone else be joining your video visit? No      Assessment & Plan     Cervical radiculopathy  Patient has worsening symptoms and has history of breast cancer which was treated with chemotherapy and she needs MRI stat  That has been scheduled for Thursday  We will reach out for prior authorization    - MR Cervical Spine w/o & w Contrast; Future she has lost a lot of weight although intentionally, this is concerning also      Personal history of malignant neoplasm of breast  As above s/p bilateral mastectomy and chemotherapy     S/P mastectomy, bilateral      S/P hemilaminotomy  patient does have left-sided foot drop chronically    Family history of ovarian cancer  Patient has been worked up for genetically    Essential hypertension  Blood pressure is excellent and amlodipine is being taken    Subjective   Bertha is a 59 year old, presenting for the following health issues:  Neck Pain (& arm pain.  Face to face visit for MRI approval )    HPI        Patient is on GLP GIP drugs for weight loss 12.5 weekly  She has lost 50 pounds of weight  That is intentional however now she has neck pain which goes to the left arm up to her 1st and 2nd finger  Patient has history of breast cancer and now the pain is also going towards her TMJ area  The pain is progressively worse      Review of Systems  Constitutional, neuro, ENT, endocrine, pulmonary, cardiac, gastrointestinal, genitourinary, musculoskeletal, integument and psychiatric systems are negative, except as otherwise noted.      Objective           Vitals:  No vitals were obtained today due to virtual visit.    Physical Exam   GENERAL: alert and no distress  EYES: Eyes grossly normal to inspection.  No discharge or erythema, or obvious scleral/conjunctival abnormalities.  RESP:  No audible wheeze, cough, or visible cyanosis.    SKIN: Visible skin clear. No significant rash, abnormal pigmentation or lesions.  NEURO: Cranial nerves grossly intact.  Mentation and speech appropriate for age.  PSYCH: Appropriate affect, tone, and pace of words    Patient Active Problem List   Diagnosis    S/P chemotherapy, time since greater than 12 weeks    S/P hemilaminotomy    S/P silicone breast implant    S/P mastectomy, bilateral    Family history of ovarian cancer    Dyslipidemia with elevated low density lipoprotein (LDL) cholesterol and abnormally low high density lipoprotein cholesterol    Subclinical hypothyroidism     Current Outpatient Medications   Medication Sig Dispense Refill    cyclobenzaprine (FLEXERIL) 10 MG tablet TAKE 1 TABLET THREE TIMES A DAY AS NEEDED FOR MUSCLE SPASMS 270 tablet 3    famotidine (PEPCID) 20 MG tablet Take 1 tablet (20 mg) by mouth 2 times daily. 90 tablet 3    lidocaine-prilocaine (EMLA) 2.5-2.5 % external cream Apply topically daily profile 30 g 4    lisinopril (ZESTRIL) 10 MG tablet Take 1 tablet (10 mg) by mouth at bedtime. 90 tablet 3    loratadine (CLARITIN) 10 MG tablet Take 1 tablet by mouth daily      multivitamin w/minerals (MULTI-VITAMIN) tablet Take 1 tablet by mouth daily.      nitroFURantoin macrocrystal-monohydrate (MACROBID) 100 MG capsule Take 1 capsule (100 mg) by mouth 2 times daily. 14 capsule 1    omeprazole (PRILOSEC) 20 MG DR capsule Take 1 capsule (20 mg) by mouth 2 times daily. 180 capsule 3    spironolactone (ALDACTONE) 50 MG tablet Take 1 tablet (50 mg) by mouth daily. 90 tablet 3    tirzepatide-Weight Management (ZEPBOUND) 12.5 MG/0.5ML prefilled pen Inject 0.5 mLs (12.5 mg) subcutaneously every 7 days. 6 mL 0    triamcinolone (ARISTOCORT HP) 0.5 % external cream Apply topically as needed for irritation 90 g 3    vitamin D3 (CHOLECALCIFEROL) 50 mcg (2000 units) tablet Take 1 tablet by mouth daily.      vitamin D3 (CHOLECALCIFEROL) 36414 units  capsule Take 1 capsule (50,000 Units) by mouth once a week (Patient not taking: Reported on 12/17/2024) 12 capsule 0    ZEPBOUND 5 MG/0.5ML prefilled pen Inject 0.5 mLs (5 mg) subcutaneously every 7 days. 2 mL 0    ZEPBOUND 7.5 MG/0.5ML prefilled pen Inject 0.5 mLs (7.5 mg) subcutaneously every 7 days. 2 mL 0     No current facility-administered medications for this visit.           Video-Visit Details    Type of service:  Video Visit   Originating Location (pt. Location): Home    Distant Location (provider location):  On-site  Platform used for Video Visit: Drew  Signed Electronically by: Zaira Schultz MD

## 2025-07-29 NOTE — TELEPHONE ENCOUNTER
"I called [phone] and got [employer].    Claim # [id number] is not working.  Need Tracking or reference #    \"Please try your call later\"      I called back. And stayed on line.  Prompts came on but I don't know which to pick.    If you are calling for a Medicare pt press 1, medicaid2, dual memebrship, [facility], individual  family business ...     I am not certain which to pick and disconnects call.    Unable to request  peer to peer review without tracking or reference #?       Sanam KAHN MA       "

## 2025-07-29 NOTE — TELEPHONE ENCOUNTER
Patient scheduled to talk to PCP today at 9:15 am.       Kaila BURNETT MA on 7/29/2025 at 9:23 AM

## 2025-08-06 ENCOUNTER — OFFICE VISIT (OUTPATIENT)
Dept: NEUROSURGERY | Facility: CLINIC | Age: 60
End: 2025-08-06
Attending: INTERNAL MEDICINE
Payer: COMMERCIAL

## 2025-08-06 ENCOUNTER — PRE VISIT (OUTPATIENT)
Dept: NEUROSURGERY | Facility: CLINIC | Age: 60
End: 2025-08-06
Payer: COMMERCIAL

## 2025-08-06 VITALS
OXYGEN SATURATION: 98 % | DIASTOLIC BLOOD PRESSURE: 67 MMHG | HEIGHT: 65 IN | SYSTOLIC BLOOD PRESSURE: 103 MMHG | WEIGHT: 177 LBS | BODY MASS INDEX: 29.49 KG/M2 | HEART RATE: 99 BPM

## 2025-08-06 DIAGNOSIS — M54.12 CERVICAL RADICULOPATHY: ICD-10-CM

## 2025-08-06 PROCEDURE — 3078F DIAST BP <80 MM HG: CPT

## 2025-08-06 PROCEDURE — 1125F AMNT PAIN NOTED PAIN PRSNT: CPT

## 2025-08-06 PROCEDURE — 99203 OFFICE O/P NEW LOW 30 MIN: CPT

## 2025-08-06 PROCEDURE — 99211 OFF/OP EST MAY X REQ PHY/QHP: CPT

## 2025-08-06 PROCEDURE — 3074F SYST BP LT 130 MM HG: CPT

## 2025-08-06 RX ORDER — METHYLPREDNISOLONE 4 MG/1
TABLET ORAL
Qty: 21 TABLET | Refills: 0 | Status: SHIPPED | OUTPATIENT
Start: 2025-08-06

## 2025-08-06 ASSESSMENT — PAIN SCALES - GENERAL: PAINLEVEL_OUTOF10: MODERATE PAIN (5)

## 2025-08-12 ENCOUNTER — MYC REFILL (OUTPATIENT)
Dept: FAMILY MEDICINE | Facility: CLINIC | Age: 60
End: 2025-08-12
Payer: COMMERCIAL

## 2025-08-12 DIAGNOSIS — E03.8 SUBCLINICAL HYPOTHYROIDISM: ICD-10-CM

## 2025-08-12 DIAGNOSIS — E78.5 DYSLIPIDEMIA WITH ELEVATED LOW DENSITY LIPOPROTEIN (LDL) CHOLESTEROL AND ABNORMALLY LOW HIGH DENSITY LIPOPROTEIN CHOLESTEROL: ICD-10-CM

## 2025-08-12 DIAGNOSIS — R73.03 PREDIABETES: ICD-10-CM

## 2025-08-12 DIAGNOSIS — N39.0 ACUTE UTI (URINARY TRACT INFECTION): ICD-10-CM

## 2025-08-12 DIAGNOSIS — Z98.890 S/P HEMILAMINOTOMY: ICD-10-CM

## 2025-08-12 DIAGNOSIS — K21.00 GASTROESOPHAGEAL REFLUX DISEASE WITH ESOPHAGITIS WITHOUT HEMORRHAGE: ICD-10-CM

## 2025-08-12 DIAGNOSIS — Z92.21 S/P CHEMOTHERAPY, TIME SINCE GREATER THAN 12 WEEKS: ICD-10-CM

## 2025-08-12 DIAGNOSIS — R74.8 ELEVATED LIVER ENZYMES: ICD-10-CM

## 2025-08-12 DIAGNOSIS — E78.5 HYPERLIPIDEMIA LDL GOAL <100: ICD-10-CM

## 2025-08-12 DIAGNOSIS — I10 ESSENTIAL HYPERTENSION: ICD-10-CM

## 2025-08-18 ENCOUNTER — TELEPHONE (OUTPATIENT)
Dept: FAMILY MEDICINE | Facility: CLINIC | Age: 60
End: 2025-08-18
Payer: COMMERCIAL

## 2025-08-26 ENCOUNTER — THERAPY VISIT (OUTPATIENT)
Dept: PHYSICAL THERAPY | Facility: CLINIC | Age: 60
End: 2025-08-26
Payer: COMMERCIAL

## 2025-08-26 DIAGNOSIS — M54.12 CERVICAL RADICULOPATHY: ICD-10-CM

## 2025-08-26 PROCEDURE — 97110 THERAPEUTIC EXERCISES: CPT | Mod: GP | Performed by: PHYSICAL THERAPIST

## 2025-08-26 PROCEDURE — 97161 PT EVAL LOW COMPLEX 20 MIN: CPT | Mod: GP | Performed by: PHYSICAL THERAPIST

## 2025-08-26 PROCEDURE — 97530 THERAPEUTIC ACTIVITIES: CPT | Mod: GP | Performed by: PHYSICAL THERAPIST
